# Patient Record
Sex: FEMALE | Race: WHITE | NOT HISPANIC OR LATINO | Employment: OTHER | URBAN - METROPOLITAN AREA
[De-identification: names, ages, dates, MRNs, and addresses within clinical notes are randomized per-mention and may not be internally consistent; named-entity substitution may affect disease eponyms.]

---

## 2023-04-10 PROBLEM — N39.43 POST-VOID DRIBBLING: Status: ACTIVE | Noted: 2023-04-10

## 2023-04-10 PROBLEM — E61.1 IRON DEFICIENCY: Status: ACTIVE | Noted: 2023-04-10

## 2023-04-10 PROBLEM — H61.21 EXCESSIVE CERUMEN IN RIGHT EAR CANAL: Status: ACTIVE | Noted: 2023-04-10

## 2023-04-10 PROBLEM — K21.9 ESOPHAGEAL REFLUX: Status: ACTIVE | Noted: 2023-04-10

## 2023-04-10 PROBLEM — Z96.659 HISTORY OF KNEE REPLACEMENT: Status: ACTIVE | Noted: 2023-04-10

## 2023-04-10 PROBLEM — E66.811 CLASS 1 OBESITY WITH BODY MASS INDEX (BMI) OF 31.0 TO 31.9 IN ADULT: Status: ACTIVE | Noted: 2023-04-10

## 2023-04-10 PROBLEM — I35.0 AORTIC VALVE STENOSIS, MODERATE: Status: ACTIVE | Noted: 2023-04-10

## 2023-04-10 PROBLEM — R25.2 LEG CRAMPS: Status: ACTIVE | Noted: 2023-04-10

## 2023-04-10 PROBLEM — R32 URINARY INCONTINENCE: Status: ACTIVE | Noted: 2023-04-10

## 2023-04-10 PROBLEM — L98.9 SKIN LESIONS, GENERALIZED: Status: ACTIVE | Noted: 2023-04-10

## 2023-04-10 PROBLEM — Z85.828 HISTORY OF BASAL CELL CARCINOMA (BCC): Status: ACTIVE | Noted: 2023-04-10

## 2023-04-10 PROBLEM — H02.401 PTOSIS OF RIGHT EYELID: Status: ACTIVE | Noted: 2023-04-10

## 2023-04-10 PROBLEM — E66.9 CLASS 1 OBESITY WITH BODY MASS INDEX (BMI) OF 31.0 TO 31.9 IN ADULT: Status: ACTIVE | Noted: 2023-04-10

## 2023-04-10 PROBLEM — R01.1 MURMUR: Status: ACTIVE | Noted: 2023-04-10

## 2023-04-10 PROBLEM — M25.472 ANKLE EDEMA, BILATERAL: Status: ACTIVE | Noted: 2023-04-10

## 2023-04-10 PROBLEM — I48.91 ATRIAL FIBRILLATION STATUS POST CARDIOVERSION (MULTI): Status: ACTIVE | Noted: 2023-04-10

## 2023-04-10 PROBLEM — Z86.59 HISTORY OF CLAUSTROPHOBIA: Status: ACTIVE | Noted: 2023-04-10

## 2023-04-10 PROBLEM — M54.50 LOWER BACK PAIN: Status: ACTIVE | Noted: 2023-04-10

## 2023-04-10 PROBLEM — R60.0 LOWER LEG EDEMA: Status: ACTIVE | Noted: 2023-04-10

## 2023-04-10 PROBLEM — L72.3 SEBACEOUS CYST: Status: ACTIVE | Noted: 2023-04-10

## 2023-04-10 PROBLEM — E51.9 VITAMIN B1 DEFICIENCY: Status: ACTIVE | Noted: 2023-04-10

## 2023-04-10 PROBLEM — R05.3 CHRONIC COUGH: Status: ACTIVE | Noted: 2023-04-10

## 2023-04-10 PROBLEM — D72.819 LEUKOPENIA: Status: ACTIVE | Noted: 2023-04-10

## 2023-04-10 PROBLEM — M19.90 ARTHRITIS: Status: ACTIVE | Noted: 2023-04-10

## 2023-04-10 PROBLEM — I25.3 ATRIAL SEPTAL ANEURYSM: Status: ACTIVE | Noted: 2023-04-10

## 2023-04-10 PROBLEM — E78.1 FAMILIAL HYPERTRIGLYCERIDEMIA: Status: ACTIVE | Noted: 2023-04-10

## 2023-04-10 PROBLEM — R09.89 BRUIT OF LEFT CAROTID ARTERY: Status: ACTIVE | Noted: 2023-04-10

## 2023-04-10 PROBLEM — R26.89 IMPAIRMENT OF BALANCE: Status: ACTIVE | Noted: 2023-04-10

## 2023-04-10 PROBLEM — L65.9 THINNING HAIR: Status: ACTIVE | Noted: 2023-04-10

## 2023-04-10 PROBLEM — R79.89 AZOTEMIA: Status: ACTIVE | Noted: 2023-04-10

## 2023-04-10 PROBLEM — M25.471 ANKLE EDEMA, BILATERAL: Status: ACTIVE | Noted: 2023-04-10

## 2023-04-10 PROBLEM — E55.9 VITAMIN D DEFICIENCY: Status: ACTIVE | Noted: 2023-04-10

## 2023-04-10 PROBLEM — H73.899: Status: ACTIVE | Noted: 2023-04-10

## 2023-04-10 PROBLEM — I83.90 ASYMPTOMATIC VARICOSE VEINS: Status: ACTIVE | Noted: 2023-04-10

## 2023-04-10 PROBLEM — M19.041 ARTHRITIS OF BOTH HANDS: Status: ACTIVE | Noted: 2023-04-10

## 2023-04-10 PROBLEM — G62.9 PERIPHERAL NEUROPATHY: Status: ACTIVE | Noted: 2023-04-10

## 2023-04-10 PROBLEM — M67.431 GANGLION OF RIGHT WRIST: Status: ACTIVE | Noted: 2023-04-10

## 2023-04-10 PROBLEM — M25.60 MORNING STIFFNESS OF JOINTS: Status: ACTIVE | Noted: 2023-04-10

## 2023-04-10 PROBLEM — E88.09 PROTEINS SERUM PLASMA LOW: Status: ACTIVE | Noted: 2023-04-10

## 2023-04-10 PROBLEM — E53.8 VITAMIN B12 DEFICIENCY (NON ANEMIC): Status: ACTIVE | Noted: 2023-04-10

## 2023-04-10 PROBLEM — M48.061 LUMBAR CANAL STENOSIS: Status: ACTIVE | Noted: 2023-04-10

## 2023-04-10 PROBLEM — R49.0 HOARSENESS, CHRONIC: Status: ACTIVE | Noted: 2023-04-10

## 2023-04-10 PROBLEM — H61.21 IMPACTED CERUMEN OF RIGHT EAR: Status: ACTIVE | Noted: 2023-04-10

## 2023-04-10 PROBLEM — Z04.9 CONDITION NOT FOUND: Status: ACTIVE | Noted: 2023-04-10

## 2023-04-10 PROBLEM — R09.89 RALES: Status: ACTIVE | Noted: 2023-04-10

## 2023-04-10 PROBLEM — E83.42 HYPOMAGNESEMIA: Status: ACTIVE | Noted: 2023-04-10

## 2023-04-10 PROBLEM — I10 BENIGN ESSENTIAL HYPERTENSION: Status: ACTIVE | Noted: 2023-04-10

## 2023-04-10 PROBLEM — B35.1 FUNGAL INFECTION OF NAIL: Status: ACTIVE | Noted: 2023-04-10

## 2023-04-10 PROBLEM — R09.82 POST-NASAL DRAINAGE: Status: ACTIVE | Noted: 2023-04-10

## 2023-04-10 PROBLEM — D69.6 THROMBOCYTOPENIA (CMS-HCC): Status: ACTIVE | Noted: 2023-04-10

## 2023-04-10 PROBLEM — N18.31 STAGE 3A CHRONIC KIDNEY DISEASE (MULTI): Status: ACTIVE | Noted: 2023-04-10

## 2023-04-10 PROBLEM — K57.90 DIVERTICULOSIS: Status: ACTIVE | Noted: 2023-04-10

## 2023-04-10 PROBLEM — J45.909 ASTHMA (HHS-HCC): Status: ACTIVE | Noted: 2023-04-10

## 2023-04-10 PROBLEM — F41.9 ANXIETY DISORDER: Status: ACTIVE | Noted: 2023-04-10

## 2023-04-10 PROBLEM — D49.2 NEOPLASM OF SKIN OF FACE: Status: ACTIVE | Noted: 2023-04-10

## 2023-04-10 PROBLEM — Z85.828 HISTORY OF SQUAMOUS CELL CARCINOMA OF SKIN: Status: ACTIVE | Noted: 2023-04-10

## 2023-04-10 PROBLEM — M19.042 ARTHRITIS OF BOTH HANDS: Status: ACTIVE | Noted: 2023-04-10

## 2023-04-10 PROBLEM — J30.9 ALLERGIC RHINITIS: Status: ACTIVE | Noted: 2023-04-10

## 2023-04-10 PROBLEM — J34.89 SINUS DRAINAGE: Status: ACTIVE | Noted: 2023-04-10

## 2023-04-10 PROBLEM — I72.8 SPLENIC ARTERY ANEURYSM (CMS-HCC): Status: ACTIVE | Noted: 2023-04-10

## 2023-04-10 PROBLEM — R79.89 ELEVATED SERUM CREATININE: Status: ACTIVE | Noted: 2023-04-10

## 2023-04-10 PROBLEM — R09.89 CAROTID BRUIT: Status: ACTIVE | Noted: 2023-04-10

## 2023-04-10 PROBLEM — I48.19 PERSISTENT ATRIAL FIBRILLATION (MULTI): Status: ACTIVE | Noted: 2023-04-10

## 2023-04-10 PROBLEM — R51.9 FREQUENT HEADACHES: Status: ACTIVE | Noted: 2023-04-10

## 2023-04-10 PROBLEM — R94.31 ABNORMAL EKG: Status: ACTIVE | Noted: 2023-04-10

## 2023-04-10 PROBLEM — N39.492 POSTURAL URINARY INCONTINENCE: Status: ACTIVE | Noted: 2023-04-10

## 2023-04-10 PROBLEM — E78.00 ELEVATED LDL CHOLESTEROL LEVEL: Status: ACTIVE | Noted: 2023-04-10

## 2023-04-10 PROBLEM — E87.6 HYPOKALEMIA: Status: ACTIVE | Noted: 2023-04-10

## 2023-04-10 PROBLEM — R32 BLADDER INCONTINENCE: Status: ACTIVE | Noted: 2023-04-10

## 2023-04-10 RX ORDER — ACETAMINOPHEN 500 MG
50 TABLET ORAL DAILY
COMMUNITY

## 2023-04-10 RX ORDER — PANTOPRAZOLE SODIUM 40 MG/1
40 TABLET, DELAYED RELEASE ORAL
COMMUNITY
Start: 2011-12-09 | End: 2023-08-01 | Stop reason: SDUPTHER

## 2023-04-10 RX ORDER — CHLORTHALIDONE 25 MG/1
25 TABLET ORAL DAILY
COMMUNITY
Start: 2014-01-28 | End: 2023-08-01 | Stop reason: SDUPTHER

## 2023-04-10 RX ORDER — SERTRALINE HYDROCHLORIDE 50 MG/1
1.5 TABLET, FILM COATED ORAL DAILY
COMMUNITY
End: 2023-08-01 | Stop reason: SDUPTHER

## 2023-04-10 RX ORDER — MAGNESIUM 200 MG
1 TABLET ORAL DAILY
COMMUNITY
Start: 2020-02-06

## 2023-04-10 RX ORDER — AZELASTINE 1 MG/ML
2 SPRAY, METERED NASAL 2 TIMES DAILY PRN
COMMUNITY
Start: 2021-02-23 | End: 2023-10-18 | Stop reason: SDUPTHER

## 2023-04-10 RX ORDER — IRBESARTAN 75 MG/1
1 TABLET ORAL DAILY
COMMUNITY
Start: 2019-10-22 | End: 2023-08-01 | Stop reason: SDUPTHER
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
## 2023-04-11 ENCOUNTER — OFFICE VISIT (OUTPATIENT)
Dept: PRIMARY CARE | Facility: CLINIC | Age: 88
End: 2023-04-11
Payer: MEDICARE

## 2023-04-11 ENCOUNTER — LAB (OUTPATIENT)
Dept: LAB | Facility: LAB | Age: 88
End: 2023-04-11
Payer: MEDICARE

## 2023-04-11 VITALS
WEIGHT: 155 LBS | HEIGHT: 61 IN | HEART RATE: 64 BPM | BODY MASS INDEX: 29.27 KG/M2 | DIASTOLIC BLOOD PRESSURE: 68 MMHG | SYSTOLIC BLOOD PRESSURE: 116 MMHG | RESPIRATION RATE: 18 BRPM

## 2023-04-11 DIAGNOSIS — J45.20 MILD INTERMITTENT ASTHMA WITHOUT COMPLICATION (HHS-HCC): ICD-10-CM

## 2023-04-11 DIAGNOSIS — I72.8 SPLENIC ARTERY ANEURYSM (CMS-HCC): ICD-10-CM

## 2023-04-11 DIAGNOSIS — T23.029A BURN INJURY OF SKIN OF FINGER: ICD-10-CM

## 2023-04-11 DIAGNOSIS — D69.6 THROMBOCYTOPENIA (CMS-HCC): ICD-10-CM

## 2023-04-11 DIAGNOSIS — R79.9 ABNORMAL FINDING OF BLOOD CHEMISTRY, UNSPECIFIED: ICD-10-CM

## 2023-04-11 DIAGNOSIS — Z79.01 ANTICOAGULANT LONG-TERM USE: ICD-10-CM

## 2023-04-11 DIAGNOSIS — I10 BENIGN ESSENTIAL HYPERTENSION: Primary | ICD-10-CM

## 2023-04-11 DIAGNOSIS — N18.31 STAGE 3A CHRONIC KIDNEY DISEASE (MULTI): ICD-10-CM

## 2023-04-11 DIAGNOSIS — R60.0 LOWER LEG EDEMA: ICD-10-CM

## 2023-04-11 DIAGNOSIS — R05.3 CHRONIC COUGH: ICD-10-CM

## 2023-04-11 DIAGNOSIS — I35.0 AORTIC VALVE STENOSIS, MODERATE: ICD-10-CM

## 2023-04-11 DIAGNOSIS — I48.91 ATRIAL FIBRILLATION STATUS POST CARDIOVERSION (MULTI): ICD-10-CM

## 2023-04-11 DIAGNOSIS — G60.9 IDIOPATHIC PERIPHERAL NEUROPATHY: ICD-10-CM

## 2023-04-11 LAB
ANION GAP IN SER/PLAS: 13 MMOL/L (ref 10–20)
BASOPHILS (10*3/UL) IN BLOOD BY AUTOMATED COUNT: 0.02 X10E9/L (ref 0–0.1)
BASOPHILS/100 LEUKOCYTES IN BLOOD BY AUTOMATED COUNT: 0.5 % (ref 0–2)
CALCIUM (MG/DL) IN SER/PLAS: 9.3 MG/DL (ref 8.6–10.6)
CARBON DIOXIDE, TOTAL (MMOL/L) IN SER/PLAS: 29 MMOL/L (ref 21–32)
CHLORIDE (MMOL/L) IN SER/PLAS: 105 MMOL/L (ref 98–107)
CREATININE (MG/DL) IN SER/PLAS: 0.99 MG/DL (ref 0.5–1.05)
EOSINOPHILS (10*3/UL) IN BLOOD BY AUTOMATED COUNT: 0.07 X10E9/L (ref 0–0.4)
EOSINOPHILS/100 LEUKOCYTES IN BLOOD BY AUTOMATED COUNT: 1.7 % (ref 0–6)
ERYTHROCYTE DISTRIBUTION WIDTH (RATIO) BY AUTOMATED COUNT: 14.4 % (ref 11.5–14.5)
ERYTHROCYTE MEAN CORPUSCULAR HEMOGLOBIN CONCENTRATION (G/DL) BY AUTOMATED: 30.7 G/DL (ref 32–36)
ERYTHROCYTE MEAN CORPUSCULAR VOLUME (FL) BY AUTOMATED COUNT: 87 FL (ref 80–100)
ERYTHROCYTES (10*6/UL) IN BLOOD BY AUTOMATED COUNT: 4.15 X10E12/L (ref 4–5.2)
ESTIMATED AVERAGE GLUCOSE FOR HBA1C: 120 MG/DL
GFR FEMALE: 55 ML/MIN/1.73M2
GLUCOSE (MG/DL) IN SER/PLAS: 95 MG/DL (ref 74–99)
HEMATOCRIT (%) IN BLOOD BY AUTOMATED COUNT: 36.1 % (ref 36–46)
HEMOGLOBIN (G/DL) IN BLOOD: 11.1 G/DL (ref 12–16)
HEMOGLOBIN A1C/HEMOGLOBIN TOTAL IN BLOOD: 5.8 %
IMMATURE GRANULOCYTES/100 LEUKOCYTES IN BLOOD BY AUTOMATED COUNT: 0.2 % (ref 0–0.9)
LEUKOCYTES (10*3/UL) IN BLOOD BY AUTOMATED COUNT: 4.2 X10E9/L (ref 4.4–11.3)
LYMPHOCYTES (10*3/UL) IN BLOOD BY AUTOMATED COUNT: 1.32 X10E9/L (ref 0.8–3)
LYMPHOCYTES/100 LEUKOCYTES IN BLOOD BY AUTOMATED COUNT: 31.2 % (ref 13–44)
MONOCYTES (10*3/UL) IN BLOOD BY AUTOMATED COUNT: 0.52 X10E9/L (ref 0.05–0.8)
MONOCYTES/100 LEUKOCYTES IN BLOOD BY AUTOMATED COUNT: 12.3 % (ref 2–10)
NEUTROPHILS (10*3/UL) IN BLOOD BY AUTOMATED COUNT: 2.29 X10E9/L (ref 1.6–5.5)
NEUTROPHILS/100 LEUKOCYTES IN BLOOD BY AUTOMATED COUNT: 54.1 % (ref 40–80)
NRBC (PER 100 WBCS) BY AUTOMATED COUNT: 0 /100 WBC (ref 0–0)
PLATELETS (10*3/UL) IN BLOOD AUTOMATED COUNT: 153 X10E9/L (ref 150–450)
POTASSIUM (MMOL/L) IN SER/PLAS: 4 MMOL/L (ref 3.5–5.3)
SODIUM (MMOL/L) IN SER/PLAS: 143 MMOL/L (ref 136–145)
UREA NITROGEN (MG/DL) IN SER/PLAS: 28 MG/DL (ref 6–23)

## 2023-04-11 PROCEDURE — 80048 BASIC METABOLIC PNL TOTAL CA: CPT

## 2023-04-11 PROCEDURE — 3074F SYST BP LT 130 MM HG: CPT | Performed by: INTERNAL MEDICINE

## 2023-04-11 PROCEDURE — 1036F TOBACCO NON-USER: CPT | Performed by: INTERNAL MEDICINE

## 2023-04-11 PROCEDURE — 1159F MED LIST DOCD IN RCRD: CPT | Performed by: INTERNAL MEDICINE

## 2023-04-11 PROCEDURE — 85025 COMPLETE CBC W/AUTO DIFF WBC: CPT

## 2023-04-11 PROCEDURE — 83036 HEMOGLOBIN GLYCOSYLATED A1C: CPT

## 2023-04-11 PROCEDURE — 1160F RVW MEDS BY RX/DR IN RCRD: CPT | Performed by: INTERNAL MEDICINE

## 2023-04-11 PROCEDURE — 99214 OFFICE O/P EST MOD 30 MIN: CPT | Performed by: INTERNAL MEDICINE

## 2023-04-11 PROCEDURE — 36415 COLL VENOUS BLD VENIPUNCTURE: CPT

## 2023-04-11 PROCEDURE — 3078F DIAST BP <80 MM HG: CPT | Performed by: INTERNAL MEDICINE

## 2023-04-11 ASSESSMENT — PAIN SCALES - GENERAL: PAINLEVEL: 0-NO PAIN

## 2023-04-11 NOTE — PATIENT INSTRUCTIONS
It was nice to see you today.  Please get a blood test not fasting , today or soon to check on eliquis and kidney blood work.    We talked about the peripheral neuropathy you have and it affects your ability to tell if you burn yourself, and to open jars. It affects your feet and legs as well and the cane is a good idea.  You mentioned you did not want more testing on this and that is ok.    Blood pressure is good, heart rhythm good today and murmur is the same/stable.    Follow up 4 months, keep august appt and then wellness in October.    Exercise daily, keep moving.   Call if you want to do any physical therapy for balance.  Be careful to watch where hands are when cooking as you are not going to be able to tell if something is too hot.

## 2023-04-19 ENCOUNTER — TELEPHONE (OUTPATIENT)
Dept: PRIMARY CARE | Facility: CLINIC | Age: 88
End: 2023-04-19
Payer: MEDICARE

## 2023-04-19 DIAGNOSIS — I48.91 ATRIAL FIBRILLATION STATUS POST CARDIOVERSION (MULTI): Primary | ICD-10-CM

## 2023-04-20 DIAGNOSIS — D64.9 ANEMIA, UNSPECIFIED TYPE: Primary | ICD-10-CM

## 2023-04-20 NOTE — RESULT ENCOUNTER NOTE
Notify patient kidney tests stable and better. No diabetes found. Anemia is back and blood count is a little lower than it has been. She said she had not seen any bleeding. (Is on eliquis). PLAN:  Repeat cbc in one month- see orders. If worse will need further eval. I also ordered stool FIT test-can be picked up then, and if blood count is back to normal, she will not need to do it, but if not improved, she would need to do this tet.

## 2023-05-03 ENCOUNTER — LAB (OUTPATIENT)
Dept: LAB | Facility: LAB | Age: 88
End: 2023-05-03
Payer: MEDICARE

## 2023-05-03 DIAGNOSIS — D64.9 ANEMIA, UNSPECIFIED TYPE: ICD-10-CM

## 2023-05-03 LAB
BASOPHILS (10*3/UL) IN BLOOD BY AUTOMATED COUNT: 0.01 X10E9/L (ref 0–0.1)
BASOPHILS/100 LEUKOCYTES IN BLOOD BY AUTOMATED COUNT: 0.2 % (ref 0–2)
COBALAMIN (VITAMIN B12) (PG/ML) IN SER/PLAS: 632 PG/ML (ref 211–911)
EOSINOPHILS (10*3/UL) IN BLOOD BY AUTOMATED COUNT: 0.09 X10E9/L (ref 0–0.4)
EOSINOPHILS/100 LEUKOCYTES IN BLOOD BY AUTOMATED COUNT: 2.1 % (ref 0–6)
ERYTHROCYTE DISTRIBUTION WIDTH (RATIO) BY AUTOMATED COUNT: 14.5 % (ref 11.5–14.5)
ERYTHROCYTE MEAN CORPUSCULAR HEMOGLOBIN CONCENTRATION (G/DL) BY AUTOMATED: 30.8 G/DL (ref 32–36)
ERYTHROCYTE MEAN CORPUSCULAR VOLUME (FL) BY AUTOMATED COUNT: 86 FL (ref 80–100)
ERYTHROCYTES (10*6/UL) IN BLOOD BY AUTOMATED COUNT: 4.13 X10E12/L (ref 4–5.2)
FERRITIN (UG/LL) IN SER/PLAS: 106 UG/L (ref 8–150)
FOLATE (NG/ML) IN SER/PLAS: 15 NG/ML
HEMATOCRIT (%) IN BLOOD BY AUTOMATED COUNT: 35.4 % (ref 36–46)
HEMOGLOBIN (G/DL) IN BLOOD: 10.9 G/DL (ref 12–16)
IMMATURE GRANULOCYTES/100 LEUKOCYTES IN BLOOD BY AUTOMATED COUNT: 0.2 % (ref 0–0.9)
IRON (UG/DL) IN SER/PLAS: 30 UG/DL (ref 35–150)
IRON BINDING CAPACITY (UG/DL) IN SER/PLAS: 345 UG/DL (ref 240–445)
IRON SATURATION (%) IN SER/PLAS: 9 % (ref 25–45)
LEUKOCYTES (10*3/UL) IN BLOOD BY AUTOMATED COUNT: 4.4 X10E9/L (ref 4.4–11.3)
LYMPHOCYTES (10*3/UL) IN BLOOD BY AUTOMATED COUNT: 1.44 X10E9/L (ref 0.8–3)
LYMPHOCYTES/100 LEUKOCYTES IN BLOOD BY AUTOMATED COUNT: 32.9 % (ref 13–44)
MONOCYTES (10*3/UL) IN BLOOD BY AUTOMATED COUNT: 0.57 X10E9/L (ref 0.05–0.8)
MONOCYTES/100 LEUKOCYTES IN BLOOD BY AUTOMATED COUNT: 13 % (ref 2–10)
NEUTROPHILS (10*3/UL) IN BLOOD BY AUTOMATED COUNT: 2.26 X10E9/L (ref 1.6–5.5)
NEUTROPHILS/100 LEUKOCYTES IN BLOOD BY AUTOMATED COUNT: 51.6 % (ref 40–80)
NRBC (PER 100 WBCS) BY AUTOMATED COUNT: 0 /100 WBC (ref 0–0)
PLATELETS (10*3/UL) IN BLOOD AUTOMATED COUNT: 168 X10E9/L (ref 150–450)

## 2023-05-03 PROCEDURE — 85025 COMPLETE CBC W/AUTO DIFF WBC: CPT

## 2023-05-03 PROCEDURE — 82746 ASSAY OF FOLIC ACID SERUM: CPT

## 2023-05-03 PROCEDURE — 83540 ASSAY OF IRON: CPT

## 2023-05-03 PROCEDURE — 83550 IRON BINDING TEST: CPT

## 2023-05-03 PROCEDURE — 36415 COLL VENOUS BLD VENIPUNCTURE: CPT

## 2023-05-03 PROCEDURE — 82728 ASSAY OF FERRITIN: CPT

## 2023-05-03 PROCEDURE — 82607 VITAMIN B-12: CPT

## 2023-05-04 DIAGNOSIS — R60.0 LOCALIZED EDEMA: ICD-10-CM

## 2023-05-04 DIAGNOSIS — Z87.19 PERSONAL HISTORY OF OTHER DISEASES OF THE DIGESTIVE SYSTEM: ICD-10-CM

## 2023-05-17 NOTE — TELEPHONE ENCOUNTER
Hydrochlorothiazide, 25 mg, 1/day and not Sunday     Sertraline, 1 1 /2 tablet a day     [facility].

## 2023-05-24 RX ORDER — IRBESARTAN 75 MG/1
TABLET ORAL
Qty: 90 TABLET | Refills: 3 | OUTPATIENT
Start: 2023-05-24

## 2023-05-24 RX ORDER — PANTOPRAZOLE SODIUM 40 MG/1
TABLET, DELAYED RELEASE ORAL
Qty: 90 TABLET | Refills: 3 | OUTPATIENT
Start: 2023-05-24

## 2023-07-05 ENCOUNTER — TELEPHONE (OUTPATIENT)
Dept: PRIMARY CARE | Facility: CLINIC | Age: 88
End: 2023-07-05

## 2023-07-05 ENCOUNTER — OFFICE VISIT (OUTPATIENT)
Dept: PRIMARY CARE | Facility: CLINIC | Age: 88
End: 2023-07-05
Payer: MEDICARE

## 2023-07-05 VITALS
HEIGHT: 58 IN | SYSTOLIC BLOOD PRESSURE: 116 MMHG | DIASTOLIC BLOOD PRESSURE: 54 MMHG | RESPIRATION RATE: 18 BRPM | HEART RATE: 64 BPM | WEIGHT: 158 LBS | BODY MASS INDEX: 33.17 KG/M2

## 2023-07-05 DIAGNOSIS — N18.31 STAGE 3A CHRONIC KIDNEY DISEASE (MULTI): ICD-10-CM

## 2023-07-05 DIAGNOSIS — L98.9 SKIN LESION OF RIGHT LOWER LIMB: ICD-10-CM

## 2023-07-05 DIAGNOSIS — L03.116 CELLULITIS OF LEFT LOWER LEG: ICD-10-CM

## 2023-07-05 DIAGNOSIS — I48.19 PERSISTENT ATRIAL FIBRILLATION (MULTI): ICD-10-CM

## 2023-07-05 DIAGNOSIS — L03.115 CELLULITIS OF RIGHT LOWER EXTREMITY: ICD-10-CM

## 2023-07-05 DIAGNOSIS — D64.9 ANEMIA, UNSPECIFIED TYPE: ICD-10-CM

## 2023-07-05 DIAGNOSIS — R60.0 EDEMA OF BOTH LOWER LEGS: Primary | ICD-10-CM

## 2023-07-05 DIAGNOSIS — L98.9 SKIN LESION OF LEFT LEG: ICD-10-CM

## 2023-07-05 PROCEDURE — 1126F AMNT PAIN NOTED NONE PRSNT: CPT | Performed by: INTERNAL MEDICINE

## 2023-07-05 PROCEDURE — 99215 OFFICE O/P EST HI 40 MIN: CPT | Performed by: INTERNAL MEDICINE

## 2023-07-05 PROCEDURE — 1036F TOBACCO NON-USER: CPT | Performed by: INTERNAL MEDICINE

## 2023-07-05 PROCEDURE — 1160F RVW MEDS BY RX/DR IN RCRD: CPT | Performed by: INTERNAL MEDICINE

## 2023-07-05 PROCEDURE — 1159F MED LIST DOCD IN RCRD: CPT | Performed by: INTERNAL MEDICINE

## 2023-07-05 PROCEDURE — 3078F DIAST BP <80 MM HG: CPT | Performed by: INTERNAL MEDICINE

## 2023-07-05 PROCEDURE — 3074F SYST BP LT 130 MM HG: CPT | Performed by: INTERNAL MEDICINE

## 2023-07-05 RX ORDER — DOXYCYCLINE 100 MG/1
100 CAPSULE ORAL 2 TIMES DAILY
Qty: 20 CAPSULE | Refills: 0 | COMMUNITY
Start: 2023-07-03 | End: 2023-07-13

## 2023-07-05 RX ORDER — FUROSEMIDE 40 MG/1
TABLET ORAL
Qty: 15 TABLET | Refills: 0 | Status: SHIPPED | OUTPATIENT
Start: 2023-07-05 | End: 2023-10-18 | Stop reason: SDUPTHER

## 2023-07-05 ASSESSMENT — PAIN SCALES - GENERAL: PAINLEVEL: 2
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
## 2023-07-05 NOTE — TELEPHONE ENCOUNTER
Pt was in urgent care for sores on her legs on Monday. Pt was given an antibiotic (doxycycline BID x 10 days)  and was told to FU with us. Pt denies any fever. No drainage. She was asked to come in today at 5 per VO Dr Perez.

## 2023-07-05 NOTE — PATIENT INSTRUCTIONS
You: Call wound clinic to make an appointment:  [facility] wound care  [address]   [address]  [place]  [address]  Phone [phone].  I placed the referral to [facility] wound clinic :if you cannot get an appointment with [facility], then  [place] is our back up.    Blood test tomorrow -not fasting. Best to do in the am so I can get the results back promptly.  We will schedule doppler ultrasound to check for blood clots in your legs.    Finish doxycycline, take for 10 days.  Add furosemide/lasix 40mg: one tab once per day in the am for 3 days.  Elevate legs often throughout the day: Toes above level of heart: to shoulder level.Do this for 30 minutes at a time several times throughout the day.  Keep legs wrapped.    Keep 8-1 appt.   But also, add next week to check legs.

## 2023-07-06 ENCOUNTER — LAB (OUTPATIENT)
Dept: LAB | Facility: LAB | Age: 88
End: 2023-07-06
Payer: MEDICARE

## 2023-07-06 DIAGNOSIS — L03.115 CELLULITIS OF RIGHT LOWER EXTREMITY: ICD-10-CM

## 2023-07-06 DIAGNOSIS — D64.9 ANEMIA, UNSPECIFIED TYPE: ICD-10-CM

## 2023-07-06 DIAGNOSIS — N18.31 STAGE 3A CHRONIC KIDNEY DISEASE (MULTI): ICD-10-CM

## 2023-07-06 DIAGNOSIS — R60.0 EDEMA OF BOTH LOWER LEGS: ICD-10-CM

## 2023-07-06 LAB
ALANINE AMINOTRANSFERASE (SGPT) (U/L) IN SER/PLAS: 7 U/L (ref 7–45)
ALBUMIN (G/DL) IN SER/PLAS: 3.8 G/DL (ref 3.4–5)
ALKALINE PHOSPHATASE (U/L) IN SER/PLAS: 80 U/L (ref 33–136)
ANION GAP IN SER/PLAS: 13 MMOL/L (ref 10–20)
ASPARTATE AMINOTRANSFERASE (SGOT) (U/L) IN SER/PLAS: 12 U/L (ref 9–39)
BASOPHILS (10*3/UL) IN BLOOD BY AUTOMATED COUNT: 0.01 X10E9/L (ref 0–0.1)
BASOPHILS/100 LEUKOCYTES IN BLOOD BY AUTOMATED COUNT: 0.2 % (ref 0–2)
BILIRUBIN TOTAL (MG/DL) IN SER/PLAS: 0.7 MG/DL (ref 0–1.2)
CALCIUM (MG/DL) IN SER/PLAS: 9.1 MG/DL (ref 8.6–10.6)
CARBON DIOXIDE, TOTAL (MMOL/L) IN SER/PLAS: 29 MMOL/L (ref 21–32)
CHLORIDE (MMOL/L) IN SER/PLAS: 104 MMOL/L (ref 98–107)
CREATININE (MG/DL) IN SER/PLAS: 1.33 MG/DL (ref 0.5–1.05)
EOSINOPHILS (10*3/UL) IN BLOOD BY AUTOMATED COUNT: 0.06 X10E9/L (ref 0–0.4)
EOSINOPHILS/100 LEUKOCYTES IN BLOOD BY AUTOMATED COUNT: 1.4 % (ref 0–6)
ERYTHROCYTE DISTRIBUTION WIDTH (RATIO) BY AUTOMATED COUNT: 13.7 % (ref 11.5–14.5)
ERYTHROCYTE MEAN CORPUSCULAR HEMOGLOBIN CONCENTRATION (G/DL) BY AUTOMATED: 32 G/DL (ref 32–36)
ERYTHROCYTE MEAN CORPUSCULAR VOLUME (FL) BY AUTOMATED COUNT: 85 FL (ref 80–100)
ERYTHROCYTES (10*6/UL) IN BLOOD BY AUTOMATED COUNT: 3.71 X10E12/L (ref 4–5.2)
GFR FEMALE: 38 ML/MIN/1.73M2
GLUCOSE (MG/DL) IN SER/PLAS: 111 MG/DL (ref 74–99)
HEMATOCRIT (%) IN BLOOD BY AUTOMATED COUNT: 31.6 % (ref 36–46)
HEMOGLOBIN (G/DL) IN BLOOD: 10.1 G/DL (ref 12–16)
IMMATURE GRANULOCYTES/100 LEUKOCYTES IN BLOOD BY AUTOMATED COUNT: 0.2 % (ref 0–0.9)
LEUKOCYTES (10*3/UL) IN BLOOD BY AUTOMATED COUNT: 4.3 X10E9/L (ref 4.4–11.3)
LYMPHOCYTES (10*3/UL) IN BLOOD BY AUTOMATED COUNT: 1.24 X10E9/L (ref 0.8–3)
LYMPHOCYTES/100 LEUKOCYTES IN BLOOD BY AUTOMATED COUNT: 29.1 % (ref 13–44)
MONOCYTES (10*3/UL) IN BLOOD BY AUTOMATED COUNT: 0.53 X10E9/L (ref 0.05–0.8)
MONOCYTES/100 LEUKOCYTES IN BLOOD BY AUTOMATED COUNT: 12.4 % (ref 2–10)
NEUTROPHILS (10*3/UL) IN BLOOD BY AUTOMATED COUNT: 2.41 X10E9/L (ref 1.6–5.5)
NEUTROPHILS/100 LEUKOCYTES IN BLOOD BY AUTOMATED COUNT: 56.7 % (ref 40–80)
NRBC (PER 100 WBCS) BY AUTOMATED COUNT: 0 /100 WBC (ref 0–0)
PLATELETS (10*3/UL) IN BLOOD AUTOMATED COUNT: 159 X10E9/L (ref 150–450)
POTASSIUM (MMOL/L) IN SER/PLAS: 4.1 MMOL/L (ref 3.5–5.3)
PROTEIN TOTAL: 5.8 G/DL (ref 6.4–8.2)
SODIUM (MMOL/L) IN SER/PLAS: 142 MMOL/L (ref 136–145)
UREA NITROGEN (MG/DL) IN SER/PLAS: 34 MG/DL (ref 6–23)

## 2023-07-06 PROCEDURE — 85025 COMPLETE CBC W/AUTO DIFF WBC: CPT

## 2023-07-06 PROCEDURE — 80053 COMPREHEN METABOLIC PANEL: CPT

## 2023-07-06 PROCEDURE — 36415 COLL VENOUS BLD VENIPUNCTURE: CPT

## 2023-07-12 ENCOUNTER — OFFICE VISIT (OUTPATIENT)
Dept: PRIMARY CARE | Facility: CLINIC | Age: 88
End: 2023-07-12
Payer: MEDICARE

## 2023-07-12 ENCOUNTER — LAB (OUTPATIENT)
Dept: LAB | Facility: LAB | Age: 88
End: 2023-07-12
Payer: MEDICARE

## 2023-07-12 VITALS
RESPIRATION RATE: 16 BRPM | WEIGHT: 153 LBS | BODY MASS INDEX: 32.12 KG/M2 | SYSTOLIC BLOOD PRESSURE: 124 MMHG | HEART RATE: 60 BPM | HEIGHT: 58 IN | DIASTOLIC BLOOD PRESSURE: 62 MMHG

## 2023-07-12 DIAGNOSIS — D64.9 ANEMIA, UNSPECIFIED TYPE: Primary | ICD-10-CM

## 2023-07-12 DIAGNOSIS — R60.0 EDEMA OF BOTH LOWER LEGS: ICD-10-CM

## 2023-07-12 DIAGNOSIS — Z79.01 ANTICOAGULANT LONG-TERM USE: ICD-10-CM

## 2023-07-12 DIAGNOSIS — N18.31 STAGE 3A CHRONIC KIDNEY DISEASE (MULTI): ICD-10-CM

## 2023-07-12 DIAGNOSIS — L03.115 CELLULITIS OF RIGHT LOWER EXTREMITY: ICD-10-CM

## 2023-07-12 DIAGNOSIS — L03.116 CELLULITIS OF LEFT LOWER LEG: ICD-10-CM

## 2023-07-12 DIAGNOSIS — L98.9 SKIN LESION OF RIGHT LOWER LIMB: ICD-10-CM

## 2023-07-12 DIAGNOSIS — I48.91 ATRIAL FIBRILLATION STATUS POST CARDIOVERSION (MULTI): ICD-10-CM

## 2023-07-12 DIAGNOSIS — L98.9 SKIN LESION OF LEFT LEG: ICD-10-CM

## 2023-07-12 PROCEDURE — 3074F SYST BP LT 130 MM HG: CPT | Performed by: INTERNAL MEDICINE

## 2023-07-12 PROCEDURE — 1159F MED LIST DOCD IN RCRD: CPT | Performed by: INTERNAL MEDICINE

## 2023-07-12 PROCEDURE — 3078F DIAST BP <80 MM HG: CPT | Performed by: INTERNAL MEDICINE

## 2023-07-12 PROCEDURE — 1160F RVW MEDS BY RX/DR IN RCRD: CPT | Performed by: INTERNAL MEDICINE

## 2023-07-12 PROCEDURE — 99214 OFFICE O/P EST MOD 30 MIN: CPT | Performed by: INTERNAL MEDICINE

## 2023-07-12 PROCEDURE — 1036F TOBACCO NON-USER: CPT | Performed by: INTERNAL MEDICINE

## 2023-07-12 PROCEDURE — 80048 BASIC METABOLIC PNL TOTAL CA: CPT

## 2023-07-12 PROCEDURE — 36415 COLL VENOUS BLD VENIPUNCTURE: CPT

## 2023-07-12 PROCEDURE — 1126F AMNT PAIN NOTED NONE PRSNT: CPT | Performed by: INTERNAL MEDICINE

## 2023-07-12 ASSESSMENT — PAIN SCALES - GENERAL: PAINLEVEL: 0-NO PAIN

## 2023-07-12 NOTE — PATIENT INSTRUCTIONS
Finish the antibiotic, doxycycline.  Stop the laisx/fuorsemide for now: we will see what your blood test shows and let you know if you can finish the pills you have.    Stool tests to check for hidden blood due to anemia: stool for occult blood-3 tests on 3 separate bowel movements and one stool FIT test-all samples go back to the lab and make sure your name and ate of birth and date of test are on each sample.   the equipment to obtain the sample in the lab today.    Blood test today to check kidney function after taking the lasix.

## 2023-07-12 NOTE — PROGRESS NOTES
Subjective   Patient ID: Teresita Molina is a 88 y.o. female who presents for Follow-up (Pt was here last week because of sores on her legs. She is here today to recheck them. Pt legs are wrapped by wound care and they told her that they look really good. They change the dressings every other day and she goes there again one week later. She is due to change them tomorrow, so I did not remove the wound care dressings.).  LAST VISIT PLAN 7/05/2023  PATIENT'S DISCUSSION/SUMMARY:  You: Call wound clinic to make an appointment:  [facility] wound care  [address] [place] [address]  Phone [phone].  I placed the referral to [facility] wound clinic :if you cannot get an appointment with [facility], then  [place] is our back up.  Blood test tomorrow -not fasting. Best to do in the am so I can get the results back promptly.  We will schedule doppler ultrasound to check for blood clots in your legs.  Finish doxycycline, take for 10 days.  Add furosemide/lasix 40mg: one tab once per day in the am for 3 days.  Elevate legs often throughout the day: Toes above level of heart: to shoulder level.Do this for 30 minutes at a time several times throughout the day.  Keep legs wrapped.  Keep 8-1 appt.   But also, add next week to check legs.   PROVIDER'S IMPRESSIONS:  Edema of both lower legs  Cellulitis of right lower extremity  Skin lesion of right lower limb  Skin lesion of left leg  Cellulitis of left lower leg  Anemia, unspecified type  Stage 3a chronic kidney disease  Orders Placed  CBC and Auto Differential  Comprehensive metabolic panel  Vascular US lower extremity venous duplex bilateral  Follow Up In [facility] - PCP  Referral to Wound Clinic Authorized  Medication Changes  furosemide 40 mg Take one tab every am For 3 days and then as directed.  Encounters with Related Results  Lab (7/6/2023)  END INFO FROM PRIOR VISIT    HPI  Here to follow up after lasix x 3  days for leg swelling and kirsten on leg ulcers and superficial skin infeciton.  She is still on the antibiotics.  She took lasix once per day for an unclear number of days. She still has some left, she took them at least 3 days, she cannot tell me how many days. Her days and the numbers do not match.  Her legs are still swollen but a little better  No sob cp palpitations  She has known afib rate controlled and on anticoag.  No bleeding  No fever chills  Went to wound center yesterday, first appt.  Legs are wrapped.  She notes they said they could not see any of our records. (They can on community record but may not know how to look).  [facility] has its own independent ehr.  ul/s bilaeral legs no dvt.  Re anemia, needs stool tests to look for ob  Ckd showed some exacerbation on last weeks labs.  She denies abd pain melena hematuria hematochezia, headaches.  No diarrhea with the atb no heartburn    Soboe is same at top of steps  Had echo October 2022, has chronic leg edema just worse recently.  Last chest imaging 2021 but has no chest complaints.    Scribe Transcription:  She thought she did stool testing but I looked in the old system and there was nothing there related to that.     She states she went to wound care and is supposed to change her dressings every day. She is finishing her antibiotic. She took Lasix yesterday and she said she’s only taken three pills. She has no new Sob, CP, and her legs are not worse for swelling. She had a negative US for DVT.  She denies melena/hematochezia and she reports having a bowel movement daily. She has recently lost 5 lbs.    Scribe Attestation  By signing my name below, I, Jw Kirby   attest that this documentation has been prepared under the direction and in the presence of Maya Perez MD.   Review of Systems    Objective   Lab Results   Component Value Date    WBC 4.3 (L) 07/06/2023    HGB 10.1 (L) 07/06/2023    HCT 31.6 (L) 07/06/2023     07/06/2023     "CHOL 152 08/26/2022    TRIG 114 08/26/2022    HDL 30.1 (A) 08/26/2022    ALT 7 07/06/2023    AST 12 07/06/2023     07/12/2023    K 3.5 07/12/2023     07/12/2023    CREATININE 1.12 (H) 07/12/2023    BUN 37 (H) 07/12/2023    CO2 32 07/12/2023    TSH 2.18 08/26/2022    HGBA1C 5.8 (A) 04/11/2023     Physical ExamBP 124/62 (BP Location: Left arm, Patient Position: Sitting, BP Cuff Size: Adult)   Pulse 60   Resp 16   Ht 1.473 m (4' 10\")   Wt 69.4 kg (153 lb)   BMI 31.98 kg/m²       5/31/2022     8:53 AM 12/1/2022     1:33 PM 12/5/2022     8:46 AM 1/17/2023     9:13 AM 4/11/2023     8:35 AM 7/5/2023     4:57 PM 7/12/2023    11:14 AM   Vitals   Systolic 125  132 154 116 116 124   Diastolic 61  75 69 68 54 62   Heart Rate 76 71 72 76 64 64 60   Temp  36.8 °C (98.2 °F)        Resp  16 18  18 18 16   Height (in) 1.499 m (4' 11\") 1.499 m (4' 11\")  1.499 m (4' 11\") 1.549 m (5' 1\") 1.473 m (4' 10\") 1.473 m (4' 10\")   Weight (lb) 157.4 153.25 155 156 155 158 153   BMI 31.79 kg/m2 30.95 kg/m2 31.31 kg/m2 31.51 kg/m2 29.29 kg/m2 33.02 kg/m2 31.98 kg/m2   BSA (m2) 1.72 m2 1.7 m2 1.71 m2 1.72 m2 1.74 m2 1.71 m2 1.69 m2   Visit Report     Report Report Report       Patient  is in no obvious distress. Normal rep rate, not sob.  HEENT:   Normocephalic, no facial asymmetry  Eyes: Sclera and conjunctiva are clear.  Neck: No adenopathy cervical (Ant/post/lat), no Supraclavicular nodes.   Thyroid normal.  Carotid pulses normal, no carotid bruits.  Lungs : RR normal. Clear to auscultation anterior, posterior and lateral. No rales, wheezes rhonchi or rubs. Good air exchange.  Heart: afib tho fairly regular. Aortic stenosis systolic Murmur heard, unchanged.No, gallop, click or rub.  Abd: see last weeks exam.Extremities: no upper extremity edema. Legs are both wrapped to knees. I peeked under right wrap and can tell the pink/redness is less than when here last week. Also legs still swollen but less so and she notes they ar shira " longer seeping.  Musculoskeletal: no synovitis of joints seen. No new deformity.  Neuro: CN 2-12 intact. Alert, appropriate.  Ambulates independently.with cane. Has neuropathy.  No gross motor deficit.   No tremors.  Psych: normal mood and affect. Not happy at wait in office.   Skin: No rash, bruising petechiae or jaundice. In visible skin. Legs as above.    Assessment/Plan   Problem List Items Addressed This Visit       Atrial fibrillation status post cardioversion (CMS/[facility])    Stage 3a chronic kidney disease    Relevant Orders    Basic metabolic panel (Completed)     Other Visit Diagnoses       Anemia, unspecified type    -  Primary    Relevant Orders    Fecal Occult Blood Immunoassy    Occult Blood X 1, Stool    Occult blood x 1, stool    Occult Blood X 1, Stool    Cellulitis of right lower extremity        Edema of both lower legs        Cellulitis of left lower leg        Skin lesion of right lower limb        Skin lesion of left leg        Anticoagulant long-term use             Her anemia is mild, but she does need a GI work-up at least to assess for hidden bleeding.  Stool testing ordered, she is on an anticoagulant so that will complicate things but will be helpful if they are negative.  She had an acute process of CKD 3 AA, we need to recheck those labs now that she has been on Lasix to be sure not worse.  Her leg edema is improving slowly, if her renal function is stable to improved then we will add more days of furosemide.  She needs to finish the antibiotic for the cellulitis but I can tell is already improving and apparently the wound center thought that it looked good.  This would not of been a description for of her legs from last week.  Chronic A-fib unchanged rate controlled  Aortic stenosis, chronic heart failure, no respiratory symptoms.  Close follow-up.

## 2023-07-13 LAB
ANION GAP IN SER/PLAS: 16 MMOL/L (ref 10–20)
CALCIUM (MG/DL) IN SER/PLAS: 8.9 MG/DL (ref 8.6–10.6)
CARBON DIOXIDE, TOTAL (MMOL/L) IN SER/PLAS: 32 MMOL/L (ref 21–32)
CHLORIDE (MMOL/L) IN SER/PLAS: 101 MMOL/L (ref 98–107)
CREATININE (MG/DL) IN SER/PLAS: 1.12 MG/DL (ref 0.5–1.05)
GFR FEMALE: 47 ML/MIN/1.73M2
GLUCOSE (MG/DL) IN SER/PLAS: 85 MG/DL (ref 74–99)
POTASSIUM (MMOL/L) IN SER/PLAS: 3.5 MMOL/L (ref 3.5–5.3)
SODIUM (MMOL/L) IN SER/PLAS: 145 MMOL/L (ref 136–145)
UREA NITROGEN (MG/DL) IN SER/PLAS: 37 MG/DL (ref 6–23)

## 2023-07-14 ENCOUNTER — TELEPHONE (OUTPATIENT)
Dept: PRIMARY CARE | Facility: CLINIC | Age: 88
End: 2023-07-14
Payer: MEDICARE

## 2023-07-14 NOTE — TELEPHONE ENCOUNTER
----- Message from Maya Perez MD sent at 7/14/2023  8:02 AM EDT -----  Please call the patient Friday July 14 and tell her she can take 3 more days of  the lasix /furosemide pills at one per day that she has at home. Her kidney blood tests improving. Have her call next week  with how her leg swelling is doing. Thanks.

## 2023-07-14 NOTE — RESULT ENCOUNTER NOTE
Please call the patient Friday July 14 and tell her she can take 3 more days of  the lasix /furosemide pills at one per day that she has at home. Her kidney blood tests improving. Have her call next week  with how her leg swelling is doing. Thanks.

## 2023-07-17 ENCOUNTER — TELEPHONE (OUTPATIENT)
Dept: PRIMARY CARE | Facility: CLINIC | Age: 88
End: 2023-07-17
Payer: MEDICARE

## 2023-07-17 NOTE — TELEPHONE ENCOUNTER
Pt aware of results. Pt reported her leg swelling while on the phone and Dr Perez recommended that she take the rest of the 10 day lasix Rx. Pt reported that her leg swelling came down, but not totally and the infection is no longer red. Dr Randall aware of this. Pt aware of recommendations

## 2023-07-21 LAB
FECAL OCCULT BLD IMMUNOASSAY: NORMAL
OCCULT BLOOD, STOOL X2: NORMAL
OCCULT BLOOD, STOOL X3: NORMAL

## 2023-07-21 NOTE — PROGRESS NOTES
"Subjective   Patient ID: Teresita Molina is a 88 y.o. female who presents for Follow-up (Pt here for follow up and review wounds on legs. Pt denies any new problems or concerns at this time. Pt was instructed to wash her stocking every day, but \"the redness is still there\").  LAST VISIT PLAN 7/12/2023  PATIENT'S DISCUSSION/SUMMARY:  Finish the antibiotic, doxycycline.  Stop the laisx/fuorsemide for now: we will see what your blood test shows and let you know if you can finish the pills you have.  Stool tests to check for hidden blood due to anemia: stool for occult blood-3 tests on 3 separate bowel movements and one stool FIT test-all samples go back to the lab and make sure your name and ate of birth and date of test are on each sample.   the equipment to obtain the sample in the lab today.  Blood test today to check kidney function after taking the lasix.      PROVIDER'S IMPRESSIONS:  Anemia, unspecified type  Stage 3a chronic kidney disease  Cellulitis of right lower extremity  Edema of both lower legs  Cellulitis of left lower leg  Skin lesion of right lower limb  Skin lesion of left leg  Atrial fibrillation status post cardioversion (CMS/[facility])  Anticoagulant long-term use  Orders Placed  Basic metabolic panel  Fecal Occult Blood Immunoassy  Occult Blood X 1, Stool  Occult blood x 1, stool  Occult Blood X 1, Stool  Encounters with Related Results  Lab (7/12/2023)  END INFO FROM PRIOR VISIT    HPI  She had doxycycline she finished around July 12th.  Redness right leg is back some. Legs are better-- still swell but not seeping and swelling is less.    She is done with lasix. Is still on the hydrochlorothiazide.  She states she had 15 tabs of lasix  and has 7 tabs left.    No increased sob  No fever chills cp  No injuries or falls  Going to wound center and has appts.    Re anemia, ob neg. No active bleeding or abd pain n/v  Hgb needs rechecked and needs stool fit test.    Review of Systems    Objective   Lab " "Results   Component Value Date    WBC 4.3 (L) 07/06/2023    HGB 10.1 (L) 07/06/2023    HCT 31.6 (L) 07/06/2023     07/06/2023    CHOL 152 08/26/2022    TRIG 114 08/26/2022    HDL 30.1 (A) 08/26/2022    ALT 7 07/06/2023    AST 12 07/06/2023     07/12/2023    K 3.5 07/12/2023     07/12/2023    CREATININE 1.12 (H) 07/12/2023    BUN 37 (H) 07/12/2023    CO2 32 07/12/2023    TSH 2.18 08/26/2022    HGBA1C 5.8 (A) 04/11/2023     Hgb spring 2023 10.9 and prior to that 11.1.    Physical ExamBP 124/62 (BP Location: Right arm, Patient Position: Sitting, BP Cuff Size: Adult)   Pulse 64   Resp 16   Ht 1.499 m (4' 11\")   Wt 71.2 kg (157 lb)   BMI 31.71 kg/m²   Patient looks well and is in no obvious distress.  HEENT:   Normocephalic, no facial asymmetry  Eyes: Sclera and conjunctiva are clear.  Lungs : RR normal. Clear to auscultation anterior, posterior and lateral. No rales, wheezes rhonchi or rubs. Good air exchange.  Heart: Regular today normal rate. 2/6 systolic murmur unchanged-has known afib.  Extremities: no upper extremity edema.   July 5 exam:Severe edema lower legs with multiple superficial open areas, shallow and mild pink discoloration c/w infection more than dermatitis, right leg much more than left.    No calf tenderness nnegative homans  Dorsal pedal edema as well.-END JULY 5 EXAM  Todays exam:  No redness left lower leg and no open or seeping wounds.  Right lower leg red ness pre tib no seeping or open wounds has one wound healing. Mid level  Still edema right more than left but less.    Warm to touch. She said redness was better tho not gone, and now has come back a little.   Musculoskeletal: no synovitis of joints seen. No new deformity.  Neuro: CN 2-12 intact. Alert, appropriate.  Ambulates independently.  No gross motor deficit.   No tremors.  Psych: normal mood and affect.  Skin: No rash, bruising petechiae or jaundice.    Assessment/Plan   Problem List Items Addressed This Visit  "      Anxiety disorder    Relevant Medications    sertraline (Zoloft) 50 mg tablet    Atrial fibrillation status post cardioversion (CMS/HCC)    Relevant Medications    apixaban (Eliquis) 2.5 mg tablet    chlorthalidone (Hygroton) 25 mg tablet    irbesartan (Avapro) 75 mg tablet    Esophageal reflux    Relevant Medications    pantoprazole (ProtoNix) 40 mg EC tablet     Other Visit Diagnoses       Cellulitis of right lower extremity    -  Primary    not completely resolved, will re treat with doxycycline    Relevant Medications    doxycycline (Vibramycin) 100 mg capsule    Cellulitis of left lower leg        resolved 8-1-23    Edema of both lower legs        elevate legs. compression socks when able.    Anemia, unspecified type        stool FIT test still needed and recheck cbc    Relevant Orders    Fecal Occult Blood Immunoassy    CBC              povidone iodine

## 2023-07-22 LAB — OCCULT BLOOD, STOOL X1: NEGATIVE

## 2023-08-01 ENCOUNTER — OFFICE VISIT (OUTPATIENT)
Dept: PRIMARY CARE | Facility: CLINIC | Age: 88
End: 2023-08-01
Payer: MEDICARE

## 2023-08-01 ENCOUNTER — LAB (OUTPATIENT)
Dept: LAB | Facility: LAB | Age: 88
End: 2023-08-01
Payer: MEDICARE

## 2023-08-01 VITALS
RESPIRATION RATE: 16 BRPM | HEIGHT: 59 IN | HEART RATE: 64 BPM | SYSTOLIC BLOOD PRESSURE: 124 MMHG | DIASTOLIC BLOOD PRESSURE: 62 MMHG | BODY MASS INDEX: 31.65 KG/M2 | WEIGHT: 157 LBS

## 2023-08-01 DIAGNOSIS — L03.116 CELLULITIS OF LEFT LOWER LEG: ICD-10-CM

## 2023-08-01 DIAGNOSIS — F41.9 ANXIETY DISORDER, UNSPECIFIED TYPE: Chronic | ICD-10-CM

## 2023-08-01 DIAGNOSIS — L03.115 CELLULITIS OF RIGHT LOWER EXTREMITY: Primary | ICD-10-CM

## 2023-08-01 DIAGNOSIS — K21.9 GASTROESOPHAGEAL REFLUX DISEASE, UNSPECIFIED WHETHER ESOPHAGITIS PRESENT: ICD-10-CM

## 2023-08-01 DIAGNOSIS — D64.9 ANEMIA, UNSPECIFIED TYPE: ICD-10-CM

## 2023-08-01 DIAGNOSIS — I48.91 ATRIAL FIBRILLATION STATUS POST CARDIOVERSION (MULTI): Chronic | ICD-10-CM

## 2023-08-01 DIAGNOSIS — R60.0 EDEMA OF BOTH LOWER LEGS: ICD-10-CM

## 2023-08-01 LAB
ERYTHROCYTE DISTRIBUTION WIDTH (RATIO) BY AUTOMATED COUNT: 14.5 % (ref 11.5–14.5)
ERYTHROCYTE MEAN CORPUSCULAR HEMOGLOBIN CONCENTRATION (G/DL) BY AUTOMATED: 31.5 G/DL (ref 32–36)
ERYTHROCYTE MEAN CORPUSCULAR VOLUME (FL) BY AUTOMATED COUNT: 86 FL (ref 80–100)
ERYTHROCYTES (10*6/UL) IN BLOOD BY AUTOMATED COUNT: 3.96 X10E12/L (ref 4–5.2)
HEMATOCRIT (%) IN BLOOD BY AUTOMATED COUNT: 34 % (ref 36–46)
HEMOGLOBIN (G/DL) IN BLOOD: 10.7 G/DL (ref 12–16)
LEUKOCYTES (10*3/UL) IN BLOOD BY AUTOMATED COUNT: 4.1 X10E9/L (ref 4.4–11.3)
NRBC (PER 100 WBCS) BY AUTOMATED COUNT: 0 /100 WBC (ref 0–0)
PLATELETS (10*3/UL) IN BLOOD AUTOMATED COUNT: 154 X10E9/L (ref 150–450)

## 2023-08-01 PROCEDURE — 85027 COMPLETE CBC AUTOMATED: CPT

## 2023-08-01 PROCEDURE — 36415 COLL VENOUS BLD VENIPUNCTURE: CPT

## 2023-08-01 PROCEDURE — 3078F DIAST BP <80 MM HG: CPT | Performed by: INTERNAL MEDICINE

## 2023-08-01 PROCEDURE — 3074F SYST BP LT 130 MM HG: CPT | Performed by: INTERNAL MEDICINE

## 2023-08-01 PROCEDURE — 1159F MED LIST DOCD IN RCRD: CPT | Performed by: INTERNAL MEDICINE

## 2023-08-01 PROCEDURE — 99214 OFFICE O/P EST MOD 30 MIN: CPT | Performed by: INTERNAL MEDICINE

## 2023-08-01 PROCEDURE — 1036F TOBACCO NON-USER: CPT | Performed by: INTERNAL MEDICINE

## 2023-08-01 PROCEDURE — 1126F AMNT PAIN NOTED NONE PRSNT: CPT | Performed by: INTERNAL MEDICINE

## 2023-08-01 PROCEDURE — 1160F RVW MEDS BY RX/DR IN RCRD: CPT | Performed by: INTERNAL MEDICINE

## 2023-08-01 RX ORDER — CHLORTHALIDONE 25 MG/1
25 TABLET ORAL DAILY
Qty: 90 TABLET | Refills: 2 | Status: SHIPPED | OUTPATIENT
Start: 2023-08-01

## 2023-08-01 RX ORDER — SERTRALINE HYDROCHLORIDE 50 MG/1
75 TABLET, FILM COATED ORAL DAILY
Qty: 140 TABLET | Refills: 2 | Status: SHIPPED | OUTPATIENT
Start: 2023-08-01

## 2023-08-01 RX ORDER — IRBESARTAN 75 MG/1
75 TABLET ORAL DAILY
Qty: 90 TABLET | Refills: 2 | Status: SHIPPED | OUTPATIENT
Start: 2023-08-01

## 2023-08-01 RX ORDER — PANTOPRAZOLE SODIUM 40 MG/1
40 TABLET, DELAYED RELEASE ORAL
Qty: 90 TABLET | Refills: 2 | Status: SHIPPED | OUTPATIENT
Start: 2023-08-01

## 2023-08-01 RX ORDER — DOXYCYCLINE 100 MG/1
100 CAPSULE ORAL 2 TIMES DAILY
Qty: 20 CAPSULE | Refills: 0 | Status: SHIPPED | OUTPATIENT
Start: 2023-08-01 | End: 2023-08-11

## 2023-08-01 ASSESSMENT — PAIN SCALES - GENERAL: PAINLEVEL: 0-NO PAIN

## 2023-08-01 NOTE — PATIENT INSTRUCTIONS
Restart doxycycline for redness right lower leg, take one twice per day about 8 hours aprart with 8 oz glass of water and some non dairy food.    Stay off the furosemide/lasix: take one per day IF legs swell up more than usual.    Take chlorthalidone every day, do not skip sundays.    Blood test today.  The one stool test turned in was negative for hidden blood. We need at least one more test, please  and then turn in.    Keep wound center appts, legs are looking better.    Keep appt here in October, call if anything is worse or if you end up using all of the furosemide pills and legs are still swelling.

## 2023-08-04 ENCOUNTER — LAB (OUTPATIENT)
Dept: LAB | Facility: LAB | Age: 88
End: 2023-08-04
Payer: MEDICARE

## 2023-08-04 DIAGNOSIS — D64.9 ANEMIA, UNSPECIFIED TYPE: ICD-10-CM

## 2023-08-04 PROCEDURE — 82274 ASSAY TEST FOR BLOOD FECAL: CPT

## 2023-08-05 LAB — FECAL OCCULT BLD IMMUNOASSAY: NEGATIVE

## 2023-10-18 ENCOUNTER — LAB (OUTPATIENT)
Dept: LAB | Facility: LAB | Age: 88
End: 2023-10-18
Payer: MEDICARE

## 2023-10-18 ENCOUNTER — OFFICE VISIT (OUTPATIENT)
Dept: PRIMARY CARE | Facility: CLINIC | Age: 88
End: 2023-10-18
Payer: MEDICARE

## 2023-10-18 VITALS
WEIGHT: 158 LBS | SYSTOLIC BLOOD PRESSURE: 120 MMHG | RESPIRATION RATE: 18 BRPM | HEIGHT: 59 IN | DIASTOLIC BLOOD PRESSURE: 66 MMHG | HEART RATE: 64 BPM | BODY MASS INDEX: 31.85 KG/M2

## 2023-10-18 DIAGNOSIS — D64.9 ANEMIA, UNSPECIFIED TYPE: ICD-10-CM

## 2023-10-18 DIAGNOSIS — I48.91 ATRIAL FIBRILLATION STATUS POST CARDIOVERSION (MULTI): ICD-10-CM

## 2023-10-18 DIAGNOSIS — E61.1 IRON DEFICIENCY: ICD-10-CM

## 2023-10-18 DIAGNOSIS — Z23 NEED FOR INFLUENZA VACCINATION: ICD-10-CM

## 2023-10-18 DIAGNOSIS — J30.9 ALLERGIC RHINITIS, UNSPECIFIED SEASONALITY, UNSPECIFIED TRIGGER: Primary | ICD-10-CM

## 2023-10-18 DIAGNOSIS — M19.90 ARTHRITIS: ICD-10-CM

## 2023-10-18 DIAGNOSIS — I10 BENIGN ESSENTIAL HYPERTENSION: ICD-10-CM

## 2023-10-18 DIAGNOSIS — Z23 NEED FOR VACCINATION WITH 13-POLYVALENT PNEUMOCOCCAL CONJUGATE VACCINE: ICD-10-CM

## 2023-10-18 DIAGNOSIS — R60.0 EDEMA OF BOTH LOWER LEGS: ICD-10-CM

## 2023-10-18 DIAGNOSIS — N18.31 STAGE 3A CHRONIC KIDNEY DISEASE (MULTI): ICD-10-CM

## 2023-10-18 DIAGNOSIS — I35.0 AORTIC VALVE STENOSIS, MODERATE: ICD-10-CM

## 2023-10-18 PROCEDURE — 36415 COLL VENOUS BLD VENIPUNCTURE: CPT

## 2023-10-18 PROCEDURE — 99214 OFFICE O/P EST MOD 30 MIN: CPT | Performed by: INTERNAL MEDICINE

## 2023-10-18 PROCEDURE — 3074F SYST BP LT 130 MM HG: CPT | Performed by: INTERNAL MEDICINE

## 2023-10-18 PROCEDURE — G0008 ADMIN INFLUENZA VIRUS VAC: HCPCS | Performed by: INTERNAL MEDICINE

## 2023-10-18 PROCEDURE — 1126F AMNT PAIN NOTED NONE PRSNT: CPT | Performed by: INTERNAL MEDICINE

## 2023-10-18 PROCEDURE — 1160F RVW MEDS BY RX/DR IN RCRD: CPT | Performed by: INTERNAL MEDICINE

## 2023-10-18 PROCEDURE — 83540 ASSAY OF IRON: CPT

## 2023-10-18 PROCEDURE — 1036F TOBACCO NON-USER: CPT | Performed by: INTERNAL MEDICINE

## 2023-10-18 PROCEDURE — 90662 IIV NO PRSV INCREASED AG IM: CPT | Performed by: INTERNAL MEDICINE

## 2023-10-18 PROCEDURE — 85025 COMPLETE CBC W/AUTO DIFF WBC: CPT

## 2023-10-18 PROCEDURE — 3078F DIAST BP <80 MM HG: CPT | Performed by: INTERNAL MEDICINE

## 2023-10-18 PROCEDURE — 1159F MED LIST DOCD IN RCRD: CPT | Performed by: INTERNAL MEDICINE

## 2023-10-18 PROCEDURE — 83550 IRON BINDING TEST: CPT

## 2023-10-18 PROCEDURE — 80069 RENAL FUNCTION PANEL: CPT

## 2023-10-18 RX ORDER — PNEUMOCOCCAL 13-VALENT CONJUGATE VACCINE 2.2; 2.2; 2.2; 2.2; 2.2; 4.4; 2.2; 2.2; 2.2; 2.2; 2.2; 2.2; 2.2 UG/.5ML; UG/.5ML; UG/.5ML; UG/.5ML; UG/.5ML; UG/.5ML; UG/.5ML; UG/.5ML; UG/.5ML; UG/.5ML; UG/.5ML; UG/.5ML; UG/.5ML
0.5 INJECTION, SUSPENSION INTRAMUSCULAR ONCE
Qty: 0.5 ML | Refills: 0 | Status: SHIPPED | OUTPATIENT
Start: 2023-10-18 | End: 2023-10-18

## 2023-10-18 RX ORDER — FUROSEMIDE 40 MG/1
TABLET ORAL
Qty: 15 TABLET | Refills: 0
Start: 2023-10-18

## 2023-10-18 RX ORDER — AZELASTINE 1 MG/ML
2 SPRAY, METERED NASAL 2 TIMES DAILY PRN
Qty: 30 ML | Refills: 1 | Status: SHIPPED | OUTPATIENT
Start: 2023-10-18

## 2023-10-18 RX ORDER — IRON POLYSACCHARIDE COMPLEX 150 MG
150 CAPSULE ORAL DAILY
Qty: 90 CAPSULE | Refills: 2 | Status: SHIPPED | OUTPATIENT
Start: 2023-10-18 | End: 2024-02-27 | Stop reason: SINTOL

## 2023-10-18 ASSESSMENT — PAIN SCALES - GENERAL: PAINLEVEL: 0-NO PAIN

## 2023-10-18 NOTE — PATIENT INSTRUCTIONS
Flu shot done today.    Recommend updated covid booster as soon as you can.    Later in the fall Prevnar 13 pneumonia vaccine.    Consider the RSV vaccine later in the fall,  would do these all at separate times, a week apart or more.    These others need to be obtained at your pharmacy due to your insurance preference.    Schedule echocardiogram ordered by Dr Sim to check the aortic valve stenosis. It should be done before you see him on January 3rd.    Blood test today and will try to re do the stool FIT test.    Start niferex iron polysaccharide one per day.    Follow up in February.

## 2023-10-18 NOTE — PROGRESS NOTES
Subjective   Patient ID: Teresita Molina is a 89 y.o. female who presents for Follow-up (Pt here for follow up and review. Flu shot today. ).  LAST VISIT PLAN 8/1/23  Problem List Items Addressed This Visit         Anxiety disorder     Relevant Medications     sertraline (Zoloft) 50 mg tablet     Atrial fibrillation status post cardioversion (CMS/HCC)     Relevant Medications     apixaban (Eliquis) 2.5 mg tablet     chlorthalidone (Hygroton) 25 mg tablet     irbesartan (Avapro) 75 mg tablet     Esophageal reflux     Relevant Medications     pantoprazole (ProtoNix) 40 mg EC tablet      Other Visit Diagnoses         Cellulitis of right lower extremity    -  Primary     not completely resolved, will re treat with doxycycline     Relevant Medications     doxycycline (Vibramycin) 100 mg capsule     Cellulitis of left lower leg         resolved 8-1-23     Edema of both lower legs         elevate legs. compression socks when able.     Anemia, unspecified type         stool FIT test still needed and recheck cbc     Relevant Orders     Fecal Occult Blood Immunoassy     CBC     END INFO FROM PRIOR VISIT  HPI  Here to follow up on edema htn with ckd,.paf, anticoag use,asthma, cervical myelopathy and perip neuropathy which affect balance. She is doing well.leg swelling is gone, no asthma issues. No falls. Uses her cane or holds on.    Anemia, not taking iron due to gi upset, consider alternative form, needs lab rechecked  No blood in stool or urine or melena and never did fit test, lab had an error or something.  Scribe Transcription:  She denies taking furosemide and states she doesn’t have any at home.     She denies any abdominal pain and heartburn. She states her bowel habits are improving stating she had diarrhea but she is starting to have more formed stools. She denies taking iron.     We had a discussion about why she needs Prevnar 13 in addition to the prior P23.     She had an occult blood stool test that was negative  in July 2021. She had two ordered after that that were canceled.     Her last Cr was 1.33 which is elevated for her. Her last A1c was 5.8 in April.     Cardiac: No CP , palpitations, increased fournier  Resp: No sob, wheezing, cough  Extremities: No leg or ankle swelling, no claudication  Neuro: No dizziness, syncope, blurred vision. No new headaches.     Anticoagulant use: Denies seeing blood in urine or stool. No unusual bruising. No nose bleeds. No head trauma or unusual headaches since last visit.  Lab Results   Component Value Date    WBC 4.1 (L) 08/01/2023    HGB 10.7 (L) 08/01/2023    HCT 34.0 (L) 08/01/2023    MCV 86 08/01/2023     08/01/2023         Asthma: No complaints.  Use of rescue inhaler:   Medications : Taking them regularly , no side effects.  No sob , cough, sputum, wheezing. No soboe.    Paf-no palpitations   Scribe Attestation  By signing my name below, I, Marshall Reyez, Scribe   attest that this documentation has been prepared under the direction and in the presence of Maya Perez MD.         Review of Systems  No abd pain or hb  No blood stool in urine  Was having diarrhea, now more forming.  She is not taking iron but has it at home. Bothers her stomach. Discussed trying iron polyscaccarides if still anemic.  Current Outpatient Medications   Medication Instructions    apixaban (ELIQUIS) 2.5 mg, oral, Every 12 hours    azelastine (Astelin) 137 mcg (0.1 %) nasal spray 2 sprays, Each Nostril, 2 times daily PRN    chlorthalidone (HYGROTON) 25 mg, oral, Daily    cholecalciferol (VITAMIN D-3) 50 mcg, oral, Daily    cyanocobalamin, vitamin B-12, 1,000 mcg tablet, sublingual 1 tablet, sublingual, Daily    furosemide (Lasix) 40 mg tablet Take one tab every am For 3 days and then as directed.    irbesartan (AVAPRO) 75 mg, oral, Daily    magnesium glycinate-mag oxide 120 mg magnesium capsule 1 tablet, oral, Daily    pantoprazole (PROTONIX) 40 mg, oral, Daily before breakfast    sertraline  "(ZOLOFT) 75 mg, oral, Daily     Allergies   Allergen Reactions    Amoxicillin Shortness of breath and Swelling     Reaction Date:Unknown    Codeine Shortness of breath    Penicillins Shortness of breath     Reaction Date:Unknown    Montelukast Cough    Morphine Nausea Only     Reaction Date:Approx [id number]    Opioids - Morphine Analogues Swelling    Spironolactone Nausea Only    Tramadol Nausea Only     Objective   Lab Results   Component Value Date    WBC 4.1 (L) 08/01/2023    HGB 10.7 (L) 08/01/2023    HCT 34.0 (L) 08/01/2023     08/01/2023    CHOL 152 08/26/2022    TRIG 114 08/26/2022    HDL 30.1 (A) 08/26/2022    ALT 7 07/06/2023    AST 12 07/06/2023     07/12/2023    K 3.5 07/12/2023     07/12/2023    CREATININE 1.12 (H) 07/12/2023    BUN 37 (H) 07/12/2023    CO2 32 07/12/2023    TSH 2.18 08/26/2022    HGBA1C 5.8 (A) 04/11/2023     par  Physical ExamBP 120/66 (BP Location: Left arm, Patient Position: Sitting, BP Cuff Size: Adult)   Pulse 64   Resp 18   Ht 1.499 m (4' 11\")   Wt 71.7 kg (158 lb)   BMI 31.91 kg/m²       12/5/2022     8:46 AM 1/17/2023     9:13 AM 4/11/2023     8:35 AM 7/5/2023     4:57 PM 7/12/2023    11:14 AM 8/1/2023     8:36 AM 10/18/2023     2:25 PM   Vitals   Systolic 132 154 116 116 124 124 120   Diastolic 75 69 68 54 62 62 66   Heart Rate 72 76 64 64 60 64 64   Resp 18  18 18 16 16 18   Height (in)  1.499 m (4' 11\") 1.549 m (5' 1\") 1.473 m (4' 10\") 1.473 m (4' 10\") 1.499 m (4' 11\") 1.499 m (4' 11\")   Weight (lb) 155 156 155 158 153 157 158   BMI 31.31 kg/m2 31.51 kg/m2 29.29 kg/m2 33.02 kg/m2 31.98 kg/m2 31.71 kg/m2 31.91 kg/m2   BSA (m2) 1.71 m2 1.72 m2 1.74 m2 1.71 m2 1.69 m2 1.72 m2 1.73 m2   Visit Report   Report Report Report Report Report       Patient looks well and is in no obvious distress.  HEENT:   Normocephalic, no facial asymmetry  Eyes: Sclera and conjunctiva are clear.  Neck: No adenopathy cervical (Ant/post/lat), no Supraclavicular nodes.   Thyroid " normal.   Carotid pulses normal, no carotid bruits.  Lungs : RR normal. Clear to auscultation anterior, posterior and lateral. No rales, wheezes rhonchi or rubs. Good air exchange.  Heart: RRR. No , gallop, click or rub. Normal rate afib, 2/6 murmur from AS and no radiation or pitting edema.   Abdomen: Bowel sounds normal, No bruits. No pulsatile mass. No hepatosplenomegaly, masses or tenderness. Soft, no guarding.  Vascular:  Posterior tibialis and dorsalis pedis pulses within normal limits bilaterally.   Extremities: no upper extremity edema. No lower extremity edema.  She had cellulitis in her right lower leg that has resolved.   Musculoskeletal: no synovitis of joints seen. No new deformity.  Neuro: CN 2-12 intact. Alert, appropriate.   Ambulates independently.  No gross motor deficit.   No tremors.  Psych: normal mood and affect.  Skin: No rash, bruising petechiae or jaundice.      Teresita was seen today for follow-up.  Diagnoses and all orders for this visit:  Allergic rhinitis, unspecified seasonality, unspecified trigger (Primary)  -     azelastine (Astelin) 137 mcg (0.1 %) nasal spray; Administer 2 sprays into each nostril 2 times a day as needed for rhinitis.  Need for influenza vaccination  -     Flu vaccine, quadrivalent, high-dose, preservative free, age 65y+ (FLUZONE)  Edema of both lower legs  -     furosemide (Lasix) 40 mg tablet; Keep on med list in case needed for prn use for leg swelling in the future. Currently not needing oct 2023.  Need for vaccination with 13-polyvalent pneumococcal conjugate vaccine  -     pneumococcal conjugate (Prevnar 13, PF,) 0.5 mL vaccine; Inject 0.5 mL into the muscle 1 time for 1 dose.  Iron deficiency  -     iron polysaccharides (Nu-Iron,Niferex) 150 mg iron capsule; Take 1 capsule (150 mg) by mouth once daily. Avoid taking with dairy products, but take with food.  -     Fecal Occult Blood Immunoassy; Future  Anemia, unspecified type  -     iron polysaccharides  (Nu-Iron,Niferex) 150 mg iron capsule; Take 1 capsule (150 mg) by mouth once daily. Avoid taking with dairy products, but take with food.  -     Fecal Occult Blood Immunoassy; Future  -     CBC and Auto Differential; Future  -     Iron and TIBC; Future  -     Renal function panel; Future  Stage 3a chronic kidney disease (CMS/HCC)  Benign essential hypertension  Arthritis  Atrial fibrillation status post cardioversion (CMS/HCC)  Aortic valve stenosis, moderate  Asymp and murmur same, needs annual echo's and annual cardio follo wup on this and paf. Stable          See wrap up section for patient instructions and  additional treatment plan.   Flu shot done today.    Recommend updated covid booster as soon as you can.    Later in the fall Prevnar 13 pneumonia vaccine.    Consider the RSV vaccine later in the fall,  would do these all at separate times, a week apart or more.    These others need to be obtained at your pharmacy due to your insurance preference.    Schedule echocardiogram ordered by Dr Sim to check the aortic valve stenosis. It should be done before you see him on January 3rd.    Blood test today and will try to re do the stool FIT test.    Start niferex iron polysaccharide one per day.    Follow up in February.  ----------------  Note HCC is pulling thrombocytopenia- her platelet count was ok, resolved this issue.

## 2023-10-19 LAB
ALBUMIN SERPL BCP-MCNC: 4.3 G/DL (ref 3.4–5)
ANION GAP SERPL CALC-SCNC: 16 MMOL/L (ref 10–20)
BASOPHILS # BLD AUTO: 0.01 X10*3/UL (ref 0–0.1)
BASOPHILS NFR BLD AUTO: 0.2 %
BUN SERPL-MCNC: 22 MG/DL (ref 6–23)
CALCIUM SERPL-MCNC: 9.5 MG/DL (ref 8.6–10.6)
CHLORIDE SERPL-SCNC: 102 MMOL/L (ref 98–107)
CO2 SERPL-SCNC: 30 MMOL/L (ref 21–32)
CREAT SERPL-MCNC: 0.9 MG/DL (ref 0.5–1.05)
EOSINOPHIL # BLD AUTO: 0.05 X10*3/UL (ref 0–0.4)
EOSINOPHIL NFR BLD AUTO: 1.1 %
ERYTHROCYTE [DISTWIDTH] IN BLOOD BY AUTOMATED COUNT: 14.7 % (ref 11.5–14.5)
GFR SERPL CREATININE-BSD FRML MDRD: 61 ML/MIN/1.73M*2
GLUCOSE SERPL-MCNC: 86 MG/DL (ref 74–99)
HCT VFR BLD AUTO: 35.1 % (ref 36–46)
HGB BLD-MCNC: 10.9 G/DL (ref 12–16)
IMM GRANULOCYTES # BLD AUTO: 0.01 X10*3/UL (ref 0–0.5)
IMM GRANULOCYTES NFR BLD AUTO: 0.2 % (ref 0–0.9)
IRON SATN MFR SERPL: 13 % (ref 25–45)
IRON SERPL-MCNC: 43 UG/DL (ref 35–150)
LYMPHOCYTES # BLD AUTO: 1.27 X10*3/UL (ref 0.8–3)
LYMPHOCYTES NFR BLD AUTO: 27.9 %
MCH RBC QN AUTO: 27.1 PG (ref 26–34)
MCHC RBC AUTO-ENTMCNC: 31.1 G/DL (ref 32–36)
MCV RBC AUTO: 87 FL (ref 80–100)
MONOCYTES # BLD AUTO: 0.53 X10*3/UL (ref 0.05–0.8)
MONOCYTES NFR BLD AUTO: 11.6 %
NEUTROPHILS # BLD AUTO: 2.69 X10*3/UL (ref 1.6–5.5)
NEUTROPHILS NFR BLD AUTO: 59 %
NRBC BLD-RTO: 0 /100 WBCS (ref 0–0)
PHOSPHATE SERPL-MCNC: 3.6 MG/DL (ref 2.5–4.9)
PLATELET # BLD AUTO: 154 X10*3/UL (ref 150–450)
PMV BLD AUTO: 12 FL (ref 7.5–11.5)
POTASSIUM SERPL-SCNC: 4.1 MMOL/L (ref 3.5–5.3)
RBC # BLD AUTO: 4.02 X10*6/UL (ref 4–5.2)
SODIUM SERPL-SCNC: 144 MMOL/L (ref 136–145)
TIBC SERPL-MCNC: 342 UG/DL (ref 240–445)
UIBC SERPL-MCNC: 299 UG/DL (ref 110–370)
WBC # BLD AUTO: 4.6 X10*3/UL (ref 4.4–11.3)

## 2023-10-22 DIAGNOSIS — D64.9 ANEMIA, UNSPECIFIED TYPE: ICD-10-CM

## 2023-10-22 PROBLEM — D69.6 THROMBOCYTOPENIA (CMS-HCC): Status: RESOLVED | Noted: 2023-04-10 | Resolved: 2023-10-22

## 2023-11-04 ENCOUNTER — LAB REQUISITION (OUTPATIENT)
Dept: LAB | Facility: HOSPITAL | Age: 88
End: 2023-11-04
Payer: MEDICARE

## 2024-01-03 ENCOUNTER — HOSPITAL ENCOUNTER (OUTPATIENT)
Dept: CARDIOLOGY | Facility: CLINIC | Age: 89
Discharge: HOME | End: 2024-01-03
Payer: MEDICARE

## 2024-01-03 DIAGNOSIS — I35.0 NONRHEUMATIC AORTIC (VALVE) STENOSIS: ICD-10-CM

## 2024-01-03 PROCEDURE — 93306 TTE W/DOPPLER COMPLETE: CPT | Performed by: STUDENT IN AN ORGANIZED HEALTH CARE EDUCATION/TRAINING PROGRAM

## 2024-01-03 PROCEDURE — 93306 TTE W/DOPPLER COMPLETE: CPT

## 2024-01-08 LAB
AORTIC VALVE MEAN GRADIENT: 20.7
AORTIC VALVE PEAK VELOCITY: 3.23
AV PEAK GRADIENT: 41.7
AVA (PEAK VEL): 1.17
AVA (VTI): 1.25
EJECTION FRACTION APICAL 4 CHAMBER: 73.1
EJECTION FRACTION: 76
LEFT ATRIUM VOLUME AREA LENGTH INDEX BSA: 42.8
LEFT VENTRICLE INTERNAL DIMENSION DIASTOLE: 3.35 (ref 3.5–6)
LEFT VENTRICULAR OUTFLOW TRACT DIAMETER: 2.03
MITRAL VALVE E/E' RATIO: 20.09
RIGHT VENTRICLE FREE WALL PEAK S': 10
RIGHT VENTRICLE PEAK SYSTOLIC PRESSURE: 48.7
TRICUSPID ANNULAR PLANE SYSTOLIC EXCURSION: 1

## 2024-02-23 NOTE — PROGRESS NOTES
Subjective   Patient ID: Teresita Molina is a 89 y.o. female who presents for Follow-up (Patient here for follow up visit.).  LAST VISIT PLAN 10/18/23  Diagnoses and all orders for this visit:  Allergic rhinitis, unspecified seasonality, unspecified trigger (Primary)  -     azelastine (Astelin) 137 mcg (0.1 %) nasal spray; Administer 2 sprays into each nostril 2 times a day as needed for rhinitis.  Need for influenza vaccination  -     Flu vaccine, quadrivalent, high-dose, preservative free, age 65y+ (FLUZONE)  Edema of both lower legs  -     furosemide (Lasix) 40 mg tablet; Keep on med list in case needed for prn use for leg swelling in the future. Currently not needing oct 2023.  Need for vaccination with 13-polyvalent pneumococcal conjugate vaccine  -     pneumococcal conjugate (Prevnar 13, PF,) 0.5 mL vaccine; Inject 0.5 mL into the muscle 1 time for 1 dose.  Iron deficiency  -     iron polysaccharides (Nu-Iron,Niferex) 150 mg iron capsule; Take 1 capsule (150 mg) by mouth once daily. Avoid taking with dairy products, but take with food.  -     Fecal Occult Blood Immunoassy; Future  Anemia, unspecified type  -     iron polysaccharides (Nu-Iron,Niferex) 150 mg iron capsule; Take 1 capsule (150 mg) by mouth once daily. Avoid taking with dairy products, but take with food.  -     Fecal Occult Blood Immunoassy; Future  -     CBC and Auto Differential; Future  -     Iron and TIBC; Future  -     Renal function panel; Future  Stage 3a chronic kidney disease (CMS/HCC)  Benign essential hypertension  Arthritis  Atrial fibrillation status post cardioversion (CMS/HCC)  Aortic valve stenosis, moderate  Asymp and murmur same, needs annual echo's and annual cardio follo wup on this and paf. Stable  See wrap up section for patient instructions and  additional treatment plan.   Flu shot done today.  Recommend updated covid booster as soon as you can.  Later in the fall Prevnar 13 pneumonia vaccine.  Consider the RSV vaccine  later in the fall,  would do these all at separate times, a week apart or more.  These others need to be obtained at your pharmacy due to your insurance preference.  Schedule echocardiogram ordered by Dr Sim to check the aortic valve stenosis. It should be done before you see him on January 3rd.  Blood test today and will try to re do the stool FIT test.  Start niferex iron polysaccharide one per day.  Follow up in February.--------------  Note HCC is pulling thrombocytopenia- her platelet count was ok, resolved this issue.  END INFO FROM PRIOR VISIT  HPI  Here for follow-up on hypertension chronic leg edema spinal stenosis and myelopathy causing balance issues, neuropathy, GERD, and reactive airways.  She had a URI for the past 2 weeks but it is improving.  Per nurse pt has uri symptoms. So she has a mask.  She notes x 2 weeks getting better never fever transient sore throat and hoarse -she does have corinc haorseness  Non prod cough  Not much wheezing  No fever  Not using inhaler she does use her Astelin nasal spray at times    Leg swelling better    Viewed vaccine discussion from last visit.  She does not want a covid booster and she does not want an rsv vaccine, current rsv is 4% in the area .  She also chose not to get the Prevnar 13 that was recommended.    She has atrial fibrillation and is on an anticoagulant.  She denies chest pain palpitations denies shortness of breath dizziness falls or syncope.  Shortness of breath on exertion is the last 2 stops of the stairs when she goes up them and this has been stable for years  Anticoagulant use: Denies seeing blood in urine or stool. No unusual bruising. No nose bleeds. No head trauma or unusual headaches since last visit. Low dose apixiban  Lab Results   Component Value Date    WBC 4.6 02/27/2024    HGB 11.1 (L) 02/27/2024    HCT 36.0 02/27/2024    MCV 87 02/27/2024     02/27/2024   she has an anemia and she was to get a follow-up blood test in  December or early January, I asked her to do this today.  She forgot to get a cbc recheck December January. Will do today.  Reviewed importance of hydration    Had echo showing moderate aortic valve stenosis    Has a history of a possible calcified small splenic artery aneurysm noted on a routine x-ray in the face of moderate least severe intra-abdominal vascular calcifications in 2012 and she has never had a complaint referable to this -she saw dr gab marr at [facility] for this may 2012and a ct showed 1.1 cm splenic arterty aneurysm.They advised her to repeat a ct in 2 years and she chose not to pursue this any further.  Review of Systems  See ROS in HPI  She complains of some right hip pain at times but not bad  Current Outpatient Medications   Medication Instructions    apixaban (ELIQUIS) 2.5 mg, oral, Every 12 hours    azelastine (Astelin) 137 mcg (0.1 %) nasal spray 2 sprays, Each Nostril, 2 times daily PRN    azithromycin (Zithromax) 250 mg tablet Take 2 tablets (500 mg) by mouth once daily for 1 day, THEN 1 tablet (250 mg) once daily for 4 days. Take 2 tabs (500 mg) by mouth today, than 1 daily for 4 days..    chlorthalidone (HYGROTON) 25 mg, oral, Daily    cholecalciferol (VITAMIN D-3) 50 mcg, oral, Daily    cyanocobalamin, vitamin B-12, 1,000 mcg tablet, sublingual 1 tablet, sublingual, Daily    furosemide (Lasix) 40 mg tablet Keep on med list in case needed for prn use for leg swelling in the future. Currently not needing oct 2023.    irbesartan (AVAPRO) 75 mg, oral, Daily    magnesium glycinate-mag oxide 120 mg magnesium capsule 1 tablet, oral, Daily    pantoprazole (PROTONIX) 40 mg, oral, Daily before breakfast    sertraline (ZOLOFT) 75 mg, oral, Daily     Allergies   Allergen Reactions    Amoxicillin Shortness of breath and Swelling     Reaction Date:Unknown    Codeine Shortness of breath    Penicillins Shortness of breath     Reaction Date:Unknown    Montelukast Cough    Morphine Nausea Only     Reaction  "Date:Approx 03Sep2009    Opioids - Morphine Analogues Swelling    Spironolactone Nausea Only    Tramadol Nausea Only     Objective   Lab Results   Component Value Date    WBC 4.6 02/27/2024    HGB 11.1 (L) 02/27/2024    HCT 36.0 02/27/2024     02/27/2024    CHOL 152 08/26/2022    TRIG 114 08/26/2022    HDL 30.1 (A) 08/26/2022    ALT 7 07/06/2023    AST 12 07/06/2023     02/27/2024    K 4.6 02/27/2024     02/27/2024    CREATININE 0.99 02/27/2024    BUN 25 (H) 02/27/2024    CO2 34 (H) 02/27/2024    TSH 2.18 08/26/2022    HGBA1C 5.8 (A) 04/11/2023   Echo jan 2024 from dr webb d/w her  CONCLUSIONS:   1. Left ventricular systolic function is hyperdynamic with a 65-70% estimated ejection fraction.   2. There is moderate concentric left ventricular hypertrophy.   3. Absent A-wave on MV spectral Doppler tracing, consistent with atrial fibrillation.   4. There is low normal right ventricular systolic function.   5. The left atrium is moderate to severely dilated.   6. The right atrium is moderately dilated.   7. There is a small pericardial effusion.   8. There is moderate mitral annular calcification.   9. Mild to moderately elevated right ventricular systolic pressure.  10. Aortic valve appears abnormal.  11. Moderate aortic valve stenosis.  12. Atrial septal aneurysm present.  13. Moderate tricuspid regurgitation visualized.ent looks well and is in no obvious distress.  par  Physical ExamBP 132/67   Pulse 70   Temp 36.4 °C (97.6 °F) (Oral)   Ht 1.499 m (4' 11\")   Wt 70.8 kg (156 lb)   BMI 31.51 kg/m²       1/17/2023     9:13 AM 4/11/2023     8:35 AM 7/5/2023     4:57 PM 7/12/2023    11:14 AM 8/1/2023     8:36 AM 10/18/2023     2:25 PM 2/27/2024     8:25 AM   Vitals   Systolic 154 116 116 124 124 120 132   Diastolic 69 68 54 62 62 66 67   Heart Rate 76 64 64 60 64 64 70   Temp       36.4 °C (97.6 °F)   Resp  18 18 16 16 18    Height (in) 1.499 m (4' 11\") 1.549 m (5' 1\") 1.473 m (4' 10\") 1.473 m " "(4' 10\") 1.499 m (4' 11\") 1.499 m (4' 11\") 1.499 m (4' 11\")   Weight (lb) 156 155 158 153 157 158 156   BMI 31.51 kg/m2 29.29 kg/m2 33.02 kg/m2 31.98 kg/m2 31.71 kg/m2 31.91 kg/m2 31.51 kg/m2   BSA (m2) 1.72 m2 1.74 m2 1.71 m2 1.69 m2 1.72 m2 1.73 m2 1.72 m2   Visit Report  Report Report Report Report Report Report       HEENT: ears with scattered cerumen, dry, tm's glimpsed and not red.  Orpharynx and post pharynx completely normal and palate moves well  No sinus tenderness on palpation today, or swelling.  She sounds very nasally congested. Says has clear nasal drainage bilaterally, uses astelin but not taking claritin or mucinex now  Normocephalic, no facial asymmetry  Eyes: Sclera and conjunctiva are clear.  Neck: No adenopathy cervical (Ant/post/lat), no Supraclavicular nodes.   Thyroid normal.   Carotid pulses normal, no carotid bruits.  Lungs : RR normal. Clear to auscultation anterior, posterior and lateral. No rales, wheezes rhonchi or rubs. Good air exchange.  Clear with forced expiration as well.  Heart: mildly irregular, rate good 2-3/6 systolic murmur unchnaged. Has moderate as. No , gallop, click or rub.  Abd bs nl no bruits no femoral bruits excellent fem pulse right no abd tenderness mass or megaly  Vascular:  Posterior tibialis pulses within normal limits bilaterally.   Extremities: no upper extremity edema. 1-2 left lower leg swelling, minmal right, no dermatitis but has some pink skin macules that are not worrisome. She notes this is all unchanged and usually she has knee high comp socks on  No calf tenderness and neg homans   Musculoskeletal: no synovitis of joints seen. No new deformity. Bilateral tkrs and both knees have some pop fluid chronic  Right hip mod decr rotational rom, left hip normal, neither have pain with moviement and no lateral tenderness right hip suspect djd, she declines xray or param  Neuro: CN 2-12 intact. Alert, appropriate.   Ambulates independently with cane, balance. Does " "not want  No gross motor deficit.   No tremors.  Psych: normal mood and affect.  Skin: No rash, bruising petechiae or jaundice.        Teresita was seen today for follow-up.  Diagnoses and all orders for this visit:  URI with cough and congestion (Primary)  -     azithromycin (Zithromax) 250 mg tablet; Take 2 tablets (500 mg) by mouth once daily for 1 day, THEN 1 tablet (250 mg) once daily for 4 days. Take 2 tabs (500 mg) by mouth today, than 1 daily for 4 days..  Atrial fibrillation status post cardioversion (CMS/HCC)  Splenic artery aneurysm (CMS/HCC)  Comments:  asymp  Stage 3a chronic kidney disease (CMS/HCC)  Anemia, unspecified type  -     CBC and Auto Differential; Future  -     Iron and TIBC; Future  -     Folate; Future  -     Vitamin B12; Future  Aortic valve stenosis, moderate  Benign essential hypertension  Atrial septal aneurysm  Hypokalemia  -     Basic metabolic panel; Future  Hypomagnesemia  -     Magnesium; Future  Iron deficiency  -     Ferritin; Future  Mild persistent asthma without complication  Decreased range of right hip movement      Assessment/Plan   Problem List Items Addressed This Visit       Aortic valve stenosis, moderate    Asthma    Atrial fibrillation status post cardioversion (CMS/HCC)    Atrial septal aneurysm    Benign essential hypertension    Hypokalemia    Relevant Orders    Basic metabolic panel (Completed)    Hypomagnesemia    Relevant Orders    Magnesium    Iron deficiency    Relevant Orders    Ferritin (Completed)    Splenic artery aneurysm (CMS/HCC)     Seen on plain film 2012 w/ other moderate abd vasc calcification.Dr [facility] eval may 2012:CT 1.1 cm splenic art aneurysm.He rec rpt ct 2 years.She declined further testing.   Re discussed 2022: at feb 10,2022 visit: \"communicated with rad ops: usually no f/u for under 2.5cm splenic art aneurysm (hers 1.1 cm in 2012 at [facility] d/w pt and then with pt and son. hers did have calcium (which is " "better). she is over 85, no bruit, not symptomatic, u/s probably would not see it. would need cta, risk to kidneys-d/w her could do or not do and i did not think we needed to f/u on it but we could. .she agreed. answered sons questions. keep bp under control. (ckd3a)\"            Stage 3a chronic kidney disease (CMS/HCC)     Other Visit Diagnoses       URI with cough and congestion    -  Primary    Relevant Medications    azithromycin (Zithromax) 250 mg tablet    Anemia, unspecified type        Relevant Orders    CBC and Auto Differential (Completed)    Iron and TIBC (Completed)    Folate (Completed)    Vitamin B12 (Completed)    Decreased range of right hip movement                  See wrap up section for patient instructions and  additional treatment plan.     Blood test today to check on anemia, potassium magnesium and kidneys.  I note that you are not tolerating oral iron.   If iron is low and anemia is worse we may need to consider iron infusions. Please eat iron rich foods. We gave you a list.    Mucinex for congestion twice per day.  You can take claritin 10 mg once per day for drainage until ou are better.    There is no evidence to suggest you need an antibiotic today. If you are not improving by this weekend , or if you start to notice increased sinus pressure or a fever or worsening cough, please take the antibiotic azithromycin. You have a paper prescription for this you can fill at the pharmacy..     We noted that you may have arthritis in the right hip, but you preferred not investigating this at this time.    Stay hydrated.  Follow up 3 months, Reception please set up appts for the year, one should be her wellness visit.    Addendum 2-29: added cbc 3 months, added spep and immuno to current labs for anemia eval. This tet was normal in 2018 but this is for a new eval.  "

## 2024-02-27 ENCOUNTER — LAB (OUTPATIENT)
Dept: LAB | Facility: LAB | Age: 89
End: 2024-02-27
Payer: MEDICARE

## 2024-02-27 ENCOUNTER — OFFICE VISIT (OUTPATIENT)
Dept: PRIMARY CARE | Facility: CLINIC | Age: 89
End: 2024-02-27
Payer: MEDICARE

## 2024-02-27 VITALS
BODY MASS INDEX: 31.45 KG/M2 | TEMPERATURE: 97.6 F | WEIGHT: 156 LBS | HEIGHT: 59 IN | DIASTOLIC BLOOD PRESSURE: 67 MMHG | SYSTOLIC BLOOD PRESSURE: 132 MMHG | HEART RATE: 70 BPM

## 2024-02-27 DIAGNOSIS — I48.91 ATRIAL FIBRILLATION STATUS POST CARDIOVERSION (MULTI): ICD-10-CM

## 2024-02-27 DIAGNOSIS — E61.1 IRON DEFICIENCY: ICD-10-CM

## 2024-02-27 DIAGNOSIS — I10 BENIGN ESSENTIAL HYPERTENSION: ICD-10-CM

## 2024-02-27 DIAGNOSIS — J45.30 MILD PERSISTENT ASTHMA WITHOUT COMPLICATION (HHS-HCC): ICD-10-CM

## 2024-02-27 DIAGNOSIS — D64.9 ANEMIA, UNSPECIFIED TYPE: ICD-10-CM

## 2024-02-27 DIAGNOSIS — M25.651 DECREASED RANGE OF RIGHT HIP MOVEMENT: ICD-10-CM

## 2024-02-27 DIAGNOSIS — I72.8 SPLENIC ARTERY ANEURYSM (CMS-HCC): ICD-10-CM

## 2024-02-27 DIAGNOSIS — J06.9 URI WITH COUGH AND CONGESTION: Primary | ICD-10-CM

## 2024-02-27 DIAGNOSIS — I25.3 ATRIAL SEPTAL ANEURYSM: ICD-10-CM

## 2024-02-27 DIAGNOSIS — E83.42 HYPOMAGNESEMIA: ICD-10-CM

## 2024-02-27 DIAGNOSIS — I35.0 AORTIC VALVE STENOSIS, MODERATE: ICD-10-CM

## 2024-02-27 DIAGNOSIS — N18.31 STAGE 3A CHRONIC KIDNEY DISEASE (MULTI): ICD-10-CM

## 2024-02-27 DIAGNOSIS — E87.6 HYPOKALEMIA: ICD-10-CM

## 2024-02-27 PROBLEM — H61.21 IMPACTED CERUMEN OF RIGHT EAR: Status: RESOLVED | Noted: 2023-04-10 | Resolved: 2024-02-27

## 2024-02-27 LAB
ANION GAP SERPL CALC-SCNC: 12 MMOL/L (ref 10–20)
BASOPHILS # BLD AUTO: 0.01 X10*3/UL (ref 0–0.1)
BASOPHILS NFR BLD AUTO: 0.2 %
BUN SERPL-MCNC: 25 MG/DL (ref 6–23)
CALCIUM SERPL-MCNC: 9.5 MG/DL (ref 8.6–10.6)
CHLORIDE SERPL-SCNC: 104 MMOL/L (ref 98–107)
CO2 SERPL-SCNC: 34 MMOL/L (ref 21–32)
CREAT SERPL-MCNC: 0.99 MG/DL (ref 0.5–1.05)
EGFRCR SERPLBLD CKD-EPI 2021: 55 ML/MIN/1.73M*2
EOSINOPHIL # BLD AUTO: 0.07 X10*3/UL (ref 0–0.4)
EOSINOPHIL NFR BLD AUTO: 1.5 %
ERYTHROCYTE [DISTWIDTH] IN BLOOD BY AUTOMATED COUNT: 13.6 % (ref 11.5–14.5)
FERRITIN SERPL-MCNC: 126 NG/ML (ref 8–150)
FOLATE SERPL-MCNC: 13.1 NG/ML
GLUCOSE SERPL-MCNC: 90 MG/DL (ref 74–99)
HCT VFR BLD AUTO: 36 % (ref 36–46)
HGB BLD-MCNC: 11.1 G/DL (ref 12–16)
IMM GRANULOCYTES # BLD AUTO: 0.01 X10*3/UL (ref 0–0.5)
IMM GRANULOCYTES NFR BLD AUTO: 0.2 % (ref 0–0.9)
IRON SATN MFR SERPL: 14 % (ref 25–45)
IRON SERPL-MCNC: 46 UG/DL (ref 35–150)
LYMPHOCYTES # BLD AUTO: 1.37 X10*3/UL (ref 0.8–3)
LYMPHOCYTES NFR BLD AUTO: 29.8 %
MCH RBC QN AUTO: 26.7 PG (ref 26–34)
MCHC RBC AUTO-ENTMCNC: 30.8 G/DL (ref 32–36)
MCV RBC AUTO: 87 FL (ref 80–100)
MONOCYTES # BLD AUTO: 0.64 X10*3/UL (ref 0.05–0.8)
MONOCYTES NFR BLD AUTO: 13.9 %
NEUTROPHILS # BLD AUTO: 2.5 X10*3/UL (ref 1.6–5.5)
NEUTROPHILS NFR BLD AUTO: 54.4 %
NRBC BLD-RTO: 0 /100 WBCS (ref 0–0)
PLATELET # BLD AUTO: 165 X10*3/UL (ref 150–450)
POTASSIUM SERPL-SCNC: 4.6 MMOL/L (ref 3.5–5.3)
RBC # BLD AUTO: 4.16 X10*6/UL (ref 4–5.2)
SODIUM SERPL-SCNC: 145 MMOL/L (ref 136–145)
TIBC SERPL-MCNC: 328 UG/DL (ref 240–445)
UIBC SERPL-MCNC: 282 UG/DL (ref 110–370)
VIT B12 SERPL-MCNC: 539 PG/ML (ref 211–911)
WBC # BLD AUTO: 4.6 X10*3/UL (ref 4.4–11.3)

## 2024-02-27 PROCEDURE — 82607 VITAMIN B-12: CPT

## 2024-02-27 PROCEDURE — 99215 OFFICE O/P EST HI 40 MIN: CPT | Performed by: INTERNAL MEDICINE

## 2024-02-27 PROCEDURE — 36415 COLL VENOUS BLD VENIPUNCTURE: CPT

## 2024-02-27 PROCEDURE — 1126F AMNT PAIN NOTED NONE PRSNT: CPT | Performed by: INTERNAL MEDICINE

## 2024-02-27 PROCEDURE — G2211 COMPLEX E/M VISIT ADD ON: HCPCS | Performed by: INTERNAL MEDICINE

## 2024-02-27 PROCEDURE — 86320 SERUM IMMUNOELECTROPHORESIS: CPT | Performed by: INTERNAL MEDICINE

## 2024-02-27 PROCEDURE — 84165 PROTEIN E-PHORESIS SERUM: CPT | Performed by: INTERNAL MEDICINE

## 2024-02-27 PROCEDURE — 85025 COMPLETE CBC W/AUTO DIFF WBC: CPT

## 2024-02-27 PROCEDURE — 1159F MED LIST DOCD IN RCRD: CPT | Performed by: INTERNAL MEDICINE

## 2024-02-27 PROCEDURE — 86334 IMMUNOFIX E-PHORESIS SERUM: CPT

## 2024-02-27 PROCEDURE — 3078F DIAST BP <80 MM HG: CPT | Performed by: INTERNAL MEDICINE

## 2024-02-27 PROCEDURE — 80048 BASIC METABOLIC PNL TOTAL CA: CPT

## 2024-02-27 PROCEDURE — 82728 ASSAY OF FERRITIN: CPT

## 2024-02-27 PROCEDURE — 1160F RVW MEDS BY RX/DR IN RCRD: CPT | Performed by: INTERNAL MEDICINE

## 2024-02-27 PROCEDURE — 83540 ASSAY OF IRON: CPT

## 2024-02-27 PROCEDURE — 1157F ADVNC CARE PLAN IN RCRD: CPT | Performed by: INTERNAL MEDICINE

## 2024-02-27 PROCEDURE — 83550 IRON BINDING TEST: CPT

## 2024-02-27 PROCEDURE — 84165 PROTEIN E-PHORESIS SERUM: CPT

## 2024-02-27 PROCEDURE — 1036F TOBACCO NON-USER: CPT | Performed by: INTERNAL MEDICINE

## 2024-02-27 PROCEDURE — 82746 ASSAY OF FOLIC ACID SERUM: CPT

## 2024-02-27 PROCEDURE — 3075F SYST BP GE 130 - 139MM HG: CPT | Performed by: INTERNAL MEDICINE

## 2024-02-27 RX ORDER — AZITHROMYCIN 250 MG/1
TABLET, FILM COATED ORAL
Qty: 6 TABLET | Refills: 0 | Status: SHIPPED | OUTPATIENT
Start: 2024-02-27 | End: 2024-03-03

## 2024-02-27 ASSESSMENT — PATIENT HEALTH QUESTIONNAIRE - PHQ9
1. LITTLE INTEREST OR PLEASURE IN DOING THINGS: NOT AT ALL
SUM OF ALL RESPONSES TO PHQ9 QUESTIONS 1 AND 2: 0
2. FEELING DOWN, DEPRESSED OR HOPELESS: NOT AT ALL

## 2024-02-27 ASSESSMENT — PAIN SCALES - GENERAL: PAINLEVEL: 0-NO PAIN

## 2024-02-27 NOTE — PATIENT INSTRUCTIONS
Blood test today to check on anemia, potassium magnesium and kidneys.  I note that you are not tolerating oral iron.   If iron is low and anemia is worse we may need to consider iron infusions. Please eat iron rich foods. We gave you a list.    Mucinex for congestion twice per day.  You can take claritin 10 mg once per day for drainage until ou are better.    There is no evidence to suggest you need an antibiotic today. If you are not improving by this weekend , or if you start to notice increased sinus pressure or a fever or worsening cough, please take the antibiotic azithromycin. You have a paper prescription for this you can fill at the pharmacy..     We noted that you may have arthritis in the right hip, but you preferred not investigating this at this time.    Stay hydrated.  Follow up 3 months, Reception please set up appts for the year, one should be her wellness visit.

## 2024-02-29 DIAGNOSIS — D64.9 ANEMIA, UNSPECIFIED TYPE: Primary | ICD-10-CM

## 2024-02-29 LAB — PROT SERPL-MCNC: 6.3 G/DL (ref 6.4–8.2)

## 2024-03-01 NOTE — ASSESSMENT & PLAN NOTE
"Seen on plain film 2012 w/ other moderate abd vasc calcification.Dr [facility] surgery [facility] eval may 2012:CT 1.1 cm splenic art aneurysm.He rec rpt ct 2 years.She declined further testing.   Re discussed 2022: at feb 10,2022 visit: \"communicated with rad ops: usually no f/u for under 2.5cm splenic art aneurysm (hers 1.1 cm in 2012 at [facility] [place] [facility] d/w pt and then with pt and son. hers did have calcium (which is better). she is over 85, no bruit, not symptomatic, u/s probably would not see it. would need cta, risk to kidneys-d/w her could do or not do and i did not think we needed to f/u on it but we could. .she agreed. answered sons questions. keep bp under control. (ckd3a)\"     "

## 2024-03-01 NOTE — RESULT ENCOUNTER NOTE
Please call the patient regarding her result.  Kidney disease is mild and stable.  Potassium is good    Anemia is the same as it was 11 months ago. Continue iron rich diet. B12 and folate are ok. Since stool tests (fit and occult blood) were negative in July, and she has no abdominal complaints or bleeding complaints,  and anemia is unchanged  will not do any further stool tests. Will recheck anemia blood test in 3 months.

## 2024-03-06 LAB
ALBUMIN: 3.9 G/DL (ref 3.4–5)
ALPHA 1 GLOBULIN: 0.3 G/DL (ref 0.2–0.6)
ALPHA 2 GLOBULIN: 0.8 G/DL (ref 0.4–1.1)
BETA GLOBULIN: 0.7 G/DL (ref 0.5–1.2)
GAMMA GLOBULIN: 0.5 G/DL (ref 0.5–1.4)
IMMUNOFIXATION COMMENT: NORMAL
PATH REVIEW - SERUM IMMUNOFIXATION: NORMAL
PATH REVIEW-SERUM PROTEIN ELECTROPHORESIS: NORMAL
PROTEIN ELECTROPHORESIS COMMENT: NORMAL

## 2024-06-06 NOTE — PROGRESS NOTES
"  Patient ID: Teresita Molina is a 89 y.o. female who presents for Follow-up (Pt here for follow up and review. Pt denies any new problems or concerns at this time).  LAST VISIT PLAN FROM: 2/27/2024  Diagnoses and all orders for this visit:  URI with cough and congestion (Primary)  -     azithromycin (Zithromax) 250 mg tablet; Take 2 tablets (500 mg) by mouth once daily for 1 day, THEN 1 tablet (250 mg) once daily for 4 days. Take 2 tabs (500 mg) by mouth today, than 1 daily for 4 days..  Atrial fibrillation status post cardioversion (CMS/HCC)  Splenic artery aneurysm (CMS/HCC)  Comments:  asymp  Stage 3a chronic kidney disease (CMS/HCC)  Anemia, unspecified type  -     CBC and Auto Differential; Future  -     Iron and TIBC; Future  -     Folate; Future  -     Vitamin B12; Future  Aortic valve stenosis, moderate  Benign essential hypertension  Atrial septal aneurysm  Hypokalemia  -     Basic metabolic panel; Future  Hypomagnesemia  -     Magnesium; Future  Iron deficiency  -     Ferritin; Future  Mild persistent asthma without complication  Decreased range of right hip movement      Assessment/Plan   Problem List Items Addressed This Visit       Aortic valve stenosis, moderate    Asthma    Atrial fibrillation status post cardioversion (CMS/HCC)    Atrial septal aneurysm    Benign essential hypertension    Hypokalemia    Relevant Orders    Basic metabolic panel (Completed)    Hypomagnesemia    Relevant Orders    Magnesium    Iron deficiency    Relevant Orders    Ferritin (Completed)    Splenic artery aneurysm (CMS/HCC)     Seen on plain film 2012 w/ other moderate abd vasc calcification.Dr [facility] eval may 2012:CT 1.1 cm splenic art aneurysm.He rec rpt ct 2 years.She declined further testing.   Re discussed 2022: at feb 10,2022 visit: \"communicated with rad ops: usually no f/u for under 2.5cm splenic art aneurysm (hers 1.1 cm in 2012 at [facility] [place] [facility]). d/w pt and then with pt and son. hers did have " "calcium (which is better). she is over 85, no bruit, not symptomatic, u/s probably would not see it. would need cta, risk to kidneys-d/w her could do or not do and i did not think we needed to f/u on it but we could. .she agreed. answered sons questions. keep bp under control. ([id number])\"            Stage 3a chronic kidney disease (CMS/HCC)     Other Visit Diagnoses       URI with cough and congestion    -  Primary    Relevant Medications    azithromycin (Zithromax) 250 mg tablet    Anemia, unspecified type        Relevant Orders    CBC and Auto Differential (Completed)    Iron and TIBC (Completed)    Folate (Completed)    Vitamin B12 (Completed)    Decreased range of right hip movement              See wrap up section for patient instructions and  additional treatment plan.   Blood test today to check on anemia, potassium magnesium and kidneys.  I note that you are not tolerating oral iron.   If iron is low and anemia is worse we may need to consider iron infusions. Please eat iron rich foods. We gave you a list.  Mucinex for congestion twice per day.  You can take claritin 10 mg once per day for drainage until ou are better.  There is no evidence to suggest you need an antibiotic today. If you are not improving by this weekend , or if you start to notice increased sinus pressure or a fever or worsening cough, please take the antibiotic azithromycin. You have a paper prescription for this you can fill at the pharmacy..   We noted that you may have arthritis in the right hip, but you preferred not investigating this at this time.  Stay hydrated.  Follow up 3 months, Reception please set up appts for the year, one should be her wellness visit.  Addendum 2-29: added cbc 3 months, added spep and immuno to current labs for anemia eval. This tet was normal in 2018 but this is for a new eval.  END LAST VISIT PLAN  -------------------------------------------    HPI  Here to follow up on anemia, htn  afib on anticoag eliquis, " "hx asthma, depression, gerd.  Had a  uri with cough and congestion that was resolving at the last visit.  Anemia is stableper labs,   Note: last colonoscopy 2014, declined by pt since then despite h colon ca sister.  Hx squamous cell ca face removed/saw rene shipman, no visits I can see, asked her to make a follow up    Scribe:  Patient is an 89-year-old female who presents today for follow up on anemia, hypertension, A-Fib, asthma, depression, and GERD.and rhinitis    Chronic rhinitis: she notes she chronically has a runny nose clear (it did not occur during this visit). No discolored drainage or fever.  Patient admits on inquiry to some pressure and  frontal pain in her forehead with rhinitis -this is chronic.  She also reports itchy eyes.  She continues on Astelin for chronic rhinitis.it does help.  We did a CT of her sinuses in 2017for these same symptoms and her sinuses where clear.      Asthma:  She will use her inhaler when congested but states she does not have to use it often.     Diarrhea:  She reports intermittent loose stools or diarrhea. It is somewhat chronic but maybe a little more often this year and depends on what she eats-she does not ave a dietary trigger. We discussed it is possible that this could be related to  her sertraline medication. Sh eadmits she is depressed \"off and on\" . Mainly she misses both of her late husbands and likes to be left alone but her family is always wanting to do things with her.  We will switch her from sertraline to fluoxetine 10 mg.  I explained the weaning process from sertraline to starting the new medication.  We also discussed that she may feel a little different for a few weeks but this will pass.  Advised not to stop the medication. Advised to give the office a call to let us know how she is doing with the change in a few weeks.  I suspect this will improve her loose stools.  She denies bloodied stool or black stools.  Patient has a family history of " "colon cancer but does not wish to have a repeat colonoscopy.  Her last colonoscopy was 10 years ago.  We did do a stool fit test last year.She declined doing one this year and does not want to do any colon screening.  I recommended following up with GI ifdiarrhea does not resolve with medication change..    She reports one bowel movement today so far,it was loose, but says if she was home she would have already gone more often than once..  Appetite stable.  She states she eats very little meat.    No gerd complaints.    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 She also had skin lesions removed from left forearm but does not know what that resulted.  She saw Kasia Yuen CNP, on 03/05 2019 and a biopsy was taken of the left cheek which showed squamous cell carcinoma.  She has not followed up with Dermatology since that time.  We discussed the importance of following up with Dermatology to monitor for recurrence. She does have some rough areas on the lateral left nose area and crusty white raised irregular lesion left forearm dorsum.    Joint pain:  She reports experiencing joint pain in her hands.and that her finger joints are getting bigger. These are the pip joints. The pain is not severe. No swelling but sometimes harder to close her hands from the finger joints.    Depression:  During the day she will cook, do laundry and work around the house.  She reports that, at times, she will get depressed, at times, missing her  or not being able to do the things that she used too.  When asked about adjusting her medication or increasing it, she mentions \"you are the doctor\"-not a refusal. This was prior to the discussion about switching sertraline to fluoxetine, will see how she does on the fluoxetine.    Anemia:  She is not taking her iron supplementation because it made her sick.  She was supposed to get repeat blood work done prior to this visit, but she had " not completed yet.  She states, she did not remember.  She will go for blood work today. Denies abd pain hb, blood in stool melena. She is on an anticoagulant . Her mild anemia is chronic. She is on a ppi that can decrease absorption of dietary iron. She takes b12 to supplement as well and levels are ok.She has had hemoglobins between 10.5 and 12.1 x several years, anemia is chronic.  Has not had egd but is on ppi and no gi c/o and anemia not worse so doubt any ulcers    Afib-no palpitations. Is on anticoag-no blood in urine or stool  Htn-bp well controlled. See ros.  Edema: leg swelling has been better.     Scribe Attestation  By signing my name below, I, Maya Perez MD, Scribe attest that this documentation has been prepared under the direction and in the presence of Maya Perez MD. All medical record entries made by the Scribe were at my direction or personally dictated by me. I have reviewed the chart and agree that the record accurately reflects my personal performance of the history, physical exam, discussion and plan.     Review of Systems  See ROS in HPI  Cardiac: No CP , palpitations, increased fournier-she gets soboe at the top of stairs and this is not new.  Resp: No sob, wheezing, cough  Extremities: No leg or ankle swelling, no claudication  Neuro: No dizziness, syncope, blurred vision. No new headaches.    Current Outpatient Medications   Medication Instructions    apixaban (ELIQUIS) 2.5 mg, oral, Every 12 hours    azelastine (Astelin) 137 mcg (0.1 %) nasal spray 2 sprays, Each Nostril, 2 times daily PRN    chlorthalidone (HYGROTON) 25 mg, oral, Daily    cholecalciferol (VITAMIN D-3) 50 mcg, oral, Daily    cyanocobalamin, vitamin B-12, 1,000 mcg tablet, sublingual 1 tablet, sublingual, Daily    FLUoxetine (PROzac) 10 mg capsule Take one cap daily for one week then increase to 2 caps once per day.    furosemide (Lasix) 40 mg tablet Keep on med list in case needed for prn use for leg swelling in the  future. Currently not needing oct 2023.    irbesartan (AVAPRO) 75 mg, oral, Daily    magnesium glycinate-mag oxide 120 mg magnesium capsule 1 tablet, oral, Daily    pantoprazole (PROTONIX) 40 mg, oral, Daily before breakfast    sertraline (Zoloft) 50 mg tablet Decrease to one tab daily for one week, then stop     Allergies   Allergen Reactions    Amoxicillin Shortness of breath and Swelling     Reaction Date:Unknown    Codeine Shortness of breath    Penicillins Shortness of breath     Reaction Date:Unknown    Montelukast Cough    Morphine Nausea Only     Reaction Date:Approx [id number]    Opioids - Morphine Analogues Swelling    Spironolactone Nausea Only    Tramadol Nausea Only     Objective  Results  reviewed:  Lab Results   Component Value Date    WBC 4.6 02/27/2024    HGB 11.1 (L) 02/27/2024    HCT 36.0 02/27/2024     02/27/2024    CHOL 152 08/26/2022    TRIG 114 08/26/2022    HDL 30.1 (A) 08/26/2022    ALT 7 07/06/2023    AST 12 07/06/2023     02/27/2024    K 4.6 02/27/2024     02/27/2024    CREATININE 0.99 02/27/2024    BUN 25 (H) 02/27/2024    CO2 34 (H) 02/27/2024    TSH 2.18 08/26/2022    HGBA1C 5.8 (A) 04/11/2023    Result Notes            Component  Ref Range & Units 3 mo ago  (2/27/24) 7 mo ago  (10/18/23) 10 mo ago  (8/1/23) 11 mo ago  (7/6/23) 1 yr ago  (5/3/23) 1 yr ago  (4/11/23) 1 yr ago  (10/17/22)   WBC  4.4 - 11.3 x10*3/uL 4.6 4.6 4.1 Low  R 4.3 Low  R 4.4 R 4.2 Low  R 4.3 Low  R   nRBC  0.0 - 0.0 /100 WBCs 0.0 0.0 0.0 R 0.0 R 0.0 R 0.0 R 0.0 R   RBC  4.00 - 5.20 x10*6/uL 4.16 4.02 3.96 Low  R 3.71 Low  R 4.13 R 4.15 R 4.20 R   Hemoglobin  12.0 - 16.0 g/dL 11.1 Low  10.9 Low  10.7 Low  10.1 Low  10.9 Low  11.1 Low  12.1   Hematocrit  36.0 - 46.0 % 36.0 35.1 Low  34.0 Low  31.6 Low  35.4 Low  36.1 36.6   MCV  80 - 100 fL 87 87 86 85 86 87 87   MCH  26.0 - 34.0 pg 26.7 27.1        MCHC  32.0 - 36.0 g/dL 30.8 Low  31.1 Low  31.5 Low  32.0 30.8 Low  30.7 Low  33.1   RDW  11.5 - 14.5  "% 13.6 14.7 High  14.5 13.7 14.5 14.4 14.2   Platelets  150 - 450 x10*3/uL 165 154 154 R 159 R 168 R 153 R 167 R      She has had hemoglobins between 10.5 and 12.1 x several years, anemia is chronic.  Has not had egd but is on ppi and no gi c/o and anemia not worse so doubt any ulcers  Colonoscopy was declined by pt -last done 2014 despite [facility] colon ca. Past hx diminutive polyp.     Physical Exam  HENT:      Head:        Comments: Scaly areas left face/nose  Skin:            Comments: General area of white cursty raised nevus left forearm dorsum.     /52 (BP Location: Right arm, Patient Position: Sitting, BP Cuff Size: Adult)   Pulse 60   Resp 18   Ht 1.499 m (4' 11\")   Wt 68.9 kg (152 lb)   BMI 30.70 kg/m²       4/11/2023     8:35 AM 7/5/2023     4:57 PM 7/12/2023    11:14 AM 8/1/2023     8:36 AM 10/18/2023     2:25 PM 2/27/2024     8:25 AM 6/11/2024     9:37 AM   Vitals   Systolic 116 116 124 124 120 132 110   Diastolic 68 54 62 62 66 67 52   Heart Rate 64 64 60 64 64 70 60   Temp      36.4 °C (97.6 °F)    Resp 18 18 16 16 18  18   Height (in) 1.549 m (5' 1\") 1.473 m (4' 10\") 1.473 m (4' 10\") 1.499 m (4' 11\") 1.499 m (4' 11\") 1.499 m (4' 11\") 1.499 m (4' 11\")   Weight (lb) 155 158 153 157 158 156 152   BMI 29.29 kg/m2 33.02 kg/m2 31.98 kg/m2 31.71 kg/m2 31.91 kg/m2 31.51 kg/m2 30.7 kg/m2   BSA (m2) 1.74 m2 1.71 m2 1.69 m2 1.72 m2 1.73 m2 1.72 m2 1.69 m2   Visit Report Report Report Report Report Report Report Report       Patient is an elderly  female who looks her usual self and is in no obvious distress.  HEENT:   Normocephalic, no facial asymmetry  Eyes: Sclera and conjunctiva are clear.  She sounds mildly nasally congested. She did not cough or have any rhinorrhea during her visit today.  Eomi.  Neck: No adenopathy cervical (Ant/post/lat), no Supraclavicular nodes.   Thyroid normal.  Carotid pulses normal, no carotid bruits but has radiated murmur..  Lungs : RR normal. Clear to auscultation " anterior, posterior and lateral. No rales, wheezes rhonchi or rubs. Good air exchange.  Heart: RRR. No gallop, click or rub.  She has a 2-3/6 systolic murmur heard over the URSB and ULSB which radiates faintly to the carotid arteries and you can hear if faintly to the apex.stable  Abdomen: Bowel sounds normal, No bruits. No pulsatile mass. No hepatosplenomegaly, masses or tenderness. Soft, no guarding.  Vascular:  Posterior tibialis  pulses within normal limits bilaterally.   Extremities: no upper extremity edema. No lower extremity pitting edema. -better.  Musculoskeletal: no synovitis of joints seen. Has enlargement of pip joints both hands but no swelling.  Negative tinels phalens.  Neuro: CN 2-12 intact. Alert, appropriate.  Ambulates independently.  No gross motor deficit.   No tremors.  Psych: normal mood and affect.  Skin: No rash, bruising petechiae or jaundice.    Teresita was seen today for follow-up.  Diagnoses and all orders for this visit:  Benign essential hypertension (Primary)  -     Comprehensive metabolic panel; Future  -     Magnesium; Future  Anemia in chronic illness  -     CBC and Auto Differential; Future  -     Iron and TIBC; Future  Squamous cell carcinoma, face  Comments:  ciara shipman, 2019, invasive, removed.  Orders:  -     Cancel: Referral to Dermatology  -     Referral to Dermatology  Mild episode of recurrent major depressive disorder (CMS-HCC)  -     FLUoxetine (PROzac) 10 mg capsule; Take one cap daily for one week then increase to 2 caps once per day.  Chronic diarrhea  Mild intermittent asthma without complication (HHS-HCC)  Arthritis of both hands  -     Sedimentation Rate; Future  -     C-reactive protein; Future  Family history of colon cancer  Anxiety disorder, unspecified type  Comments:  stable refill sertraline  Orders:  -     sertraline (Zoloft) 50 mg tablet; Decrease to one tab daily for one week, then stop  Medication course changed  Colonoscopy refused  Colon cancer  screening declined     Problem List Items Addressed This Visit       Anxiety disorder    Relevant Medications    sertraline (Zoloft) 50 mg tablet    Arthritis of both hands    Relevant Orders    Sedimentation Rate    C-reactive protein    Asthma (HHS-HCC)    Benign essential hypertension - Primary    Relevant Orders    Comprehensive metabolic panel    Magnesium    Neoplasm of skin of face     Other Visit Diagnoses       Anemia in chronic illness        Relevant Orders    CBC and Auto Differential    Iron and TIBC    Mild episode of recurrent major depressive disorder (CMS-HCC)        Relevant Medications    FLUoxetine (PROzac) 10 mg capsule    Chronic diarrhea        Family history of colon cancer        Medication course changed        Colonoscopy refused        Colon cancer screening declined              I am the primary care physician for this patient's ongoing medical care, which is managed during and in between office visits.    See patient instructions in wrap up plan, orders and comments for treatment plan.  Patient Instructions:  Blood test today.    Medication change: stop sertraline and change to fluoxetine. This should help with the loose stools you are having and also help with mood.    For one week: take only one tab of sertraline 50mg and take one capsule of the new medication, fluoxetine 10 mg.  The second week stop the sertraline and change the fluoxetine to 2 capsules once per day (20 mg).    Check in with us in a few weeks on how things are going with the fluoxetine.  Keep your September appointment here.    Please schedule a follow up with Dermatology, such as ELISEO Fuentes whom you have seen previously.  With past history of squamous cell ca of the face, you should be seen at least annually for a skin check.  Also they need to look at the rough spots on your nose and left side of the face, and the areas on your left hand.    We discussed colonoscopy and your family history of colon cancer-and  that you preferred no further colonoscopies. Your last was 10 years ago. You also preferred not doing any stool testing (you did one last August and it was normal).

## 2024-06-11 ENCOUNTER — LAB (OUTPATIENT)
Dept: LAB | Facility: LAB | Age: 89
End: 2024-06-11
Payer: MEDICARE

## 2024-06-11 ENCOUNTER — OFFICE VISIT (OUTPATIENT)
Dept: PRIMARY CARE | Facility: CLINIC | Age: 89
End: 2024-06-11
Payer: MEDICARE

## 2024-06-11 VITALS
WEIGHT: 152 LBS | BODY MASS INDEX: 30.64 KG/M2 | DIASTOLIC BLOOD PRESSURE: 52 MMHG | SYSTOLIC BLOOD PRESSURE: 110 MMHG | HEART RATE: 60 BPM | HEIGHT: 59 IN | RESPIRATION RATE: 18 BRPM

## 2024-06-11 DIAGNOSIS — Z80.0 FAMILY HISTORY OF COLON CANCER: ICD-10-CM

## 2024-06-11 DIAGNOSIS — M19.041 ARTHRITIS OF BOTH HANDS: ICD-10-CM

## 2024-06-11 DIAGNOSIS — D49.2 NEOPLASM OF SKIN OF FOREARM: ICD-10-CM

## 2024-06-11 DIAGNOSIS — F41.9 ANXIETY DISORDER, UNSPECIFIED TYPE: Chronic | ICD-10-CM

## 2024-06-11 DIAGNOSIS — K52.9 CHRONIC DIARRHEA: ICD-10-CM

## 2024-06-11 DIAGNOSIS — D63.8 ANEMIA IN CHRONIC ILLNESS: ICD-10-CM

## 2024-06-11 DIAGNOSIS — D64.9 ANEMIA, UNSPECIFIED TYPE: ICD-10-CM

## 2024-06-11 DIAGNOSIS — Z53.20 COLONOSCOPY REFUSED: ICD-10-CM

## 2024-06-11 DIAGNOSIS — M19.042 ARTHRITIS OF BOTH HANDS: ICD-10-CM

## 2024-06-11 DIAGNOSIS — Z53.20 COLON CANCER SCREENING DECLINED: ICD-10-CM

## 2024-06-11 DIAGNOSIS — I48.91 ATRIAL FIBRILLATION STATUS POST CARDIOVERSION (MULTI): ICD-10-CM

## 2024-06-11 DIAGNOSIS — D49.2 NEOPLASM OF SKIN OF FACE: ICD-10-CM

## 2024-06-11 DIAGNOSIS — C44.320 SQUAMOUS CELL CARCINOMA, FACE: ICD-10-CM

## 2024-06-11 DIAGNOSIS — J45.20 MILD INTERMITTENT ASTHMA WITHOUT COMPLICATION (HHS-HCC): ICD-10-CM

## 2024-06-11 DIAGNOSIS — F33.0 MILD EPISODE OF RECURRENT MAJOR DEPRESSIVE DISORDER (CMS-HCC): ICD-10-CM

## 2024-06-11 DIAGNOSIS — R01.1 SYSTOLIC MURMUR: ICD-10-CM

## 2024-06-11 DIAGNOSIS — I10 BENIGN ESSENTIAL HYPERTENSION: ICD-10-CM

## 2024-06-11 DIAGNOSIS — I10 BENIGN ESSENTIAL HYPERTENSION: Primary | ICD-10-CM

## 2024-06-11 DIAGNOSIS — Z79.899 MEDICATION COURSE CHANGED: ICD-10-CM

## 2024-06-11 DIAGNOSIS — Z79.01 ANTICOAGULANT LONG-TERM USE: ICD-10-CM

## 2024-06-11 PROCEDURE — 36415 COLL VENOUS BLD VENIPUNCTURE: CPT

## 2024-06-11 PROCEDURE — 83550 IRON BINDING TEST: CPT

## 2024-06-11 PROCEDURE — 1123F ACP DISCUSS/DSCN MKR DOCD: CPT | Performed by: INTERNAL MEDICINE

## 2024-06-11 PROCEDURE — 80053 COMPREHEN METABOLIC PANEL: CPT

## 2024-06-11 PROCEDURE — 1157F ADVNC CARE PLAN IN RCRD: CPT | Performed by: INTERNAL MEDICINE

## 2024-06-11 PROCEDURE — G2212 PROLONG OUTPT/OFFICE VIS: HCPCS | Performed by: INTERNAL MEDICINE

## 2024-06-11 PROCEDURE — 1159F MED LIST DOCD IN RCRD: CPT | Performed by: INTERNAL MEDICINE

## 2024-06-11 PROCEDURE — 86140 C-REACTIVE PROTEIN: CPT

## 2024-06-11 PROCEDURE — 1158F ADVNC CARE PLAN TLK DOCD: CPT | Performed by: INTERNAL MEDICINE

## 2024-06-11 PROCEDURE — 3074F SYST BP LT 130 MM HG: CPT | Performed by: INTERNAL MEDICINE

## 2024-06-11 PROCEDURE — 1125F AMNT PAIN NOTED PAIN PRSNT: CPT | Performed by: INTERNAL MEDICINE

## 2024-06-11 PROCEDURE — 1036F TOBACCO NON-USER: CPT | Performed by: INTERNAL MEDICINE

## 2024-06-11 PROCEDURE — 3078F DIAST BP <80 MM HG: CPT | Performed by: INTERNAL MEDICINE

## 2024-06-11 PROCEDURE — 1160F RVW MEDS BY RX/DR IN RCRD: CPT | Performed by: INTERNAL MEDICINE

## 2024-06-11 PROCEDURE — 85025 COMPLETE CBC W/AUTO DIFF WBC: CPT

## 2024-06-11 PROCEDURE — 85652 RBC SED RATE AUTOMATED: CPT

## 2024-06-11 PROCEDURE — 99215 OFFICE O/P EST HI 40 MIN: CPT | Performed by: INTERNAL MEDICINE

## 2024-06-11 PROCEDURE — G2211 COMPLEX E/M VISIT ADD ON: HCPCS | Performed by: INTERNAL MEDICINE

## 2024-06-11 PROCEDURE — 83540 ASSAY OF IRON: CPT

## 2024-06-11 RX ORDER — SERTRALINE HYDROCHLORIDE 50 MG/1
TABLET, FILM COATED ORAL
Qty: 140 TABLET | Refills: 2 | Status: SHIPPED | OUTPATIENT
Start: 2024-06-11

## 2024-06-11 RX ORDER — FLUOXETINE 10 MG/1
CAPSULE ORAL
Qty: 60 CAPSULE | Refills: 1 | Status: SHIPPED | OUTPATIENT
Start: 2024-06-11

## 2024-06-11 ASSESSMENT — PAIN SCALES - GENERAL: PAINLEVEL: 4

## 2024-06-11 ASSESSMENT — PATIENT HEALTH QUESTIONNAIRE - PHQ9
SUM OF ALL RESPONSES TO PHQ9 QUESTIONS 1 AND 2: 0
1. LITTLE INTEREST OR PLEASURE IN DOING THINGS: NOT AT ALL
2. FEELING DOWN, DEPRESSED OR HOPELESS: NOT AT ALL

## 2024-06-11 NOTE — PATIENT INSTRUCTIONS
Blood test today.    Medication change: stop sertraline and change to fluoxetine. This should help with the loose stools you are having and also help with mood.    For one week: take only one tab of sertraline 50mg and take one capsule of the new medication, fluoxetine 10 mg.  The second week stop the sertraline and change the fluoxetine to 2 capsules once per day (20 mg).    Check in with us in a few weeks on how things are going with the fluoxetine.  Keep your September appointment here.    Please schedule a follow up with Dermatology, such as Kasia Yuen, ELISEO whom you have seen previously.  With past history of squamous cell ca of the face, you should be seen at least annually for a skin check.  Also they need to look at the rough spots on your nose and left side of the face, and the areas on your left hand.    We discussed colonoscopy and your family history of colon cancer-and that you preferred no further colonoscopies. Your last was 10 years ago. You also preferred not doing any stool testing (you did one last August and it was normal).

## 2024-06-12 LAB
ALBUMIN SERPL BCP-MCNC: 4.2 G/DL (ref 3.4–5)
ALP SERPL-CCNC: 76 U/L (ref 33–136)
ALT SERPL W P-5'-P-CCNC: 8 U/L (ref 7–45)
ANION GAP SERPL CALC-SCNC: 15 MMOL/L (ref 10–20)
AST SERPL W P-5'-P-CCNC: 15 U/L (ref 9–39)
BASOPHILS # BLD AUTO: 0.02 X10*3/UL (ref 0–0.1)
BASOPHILS NFR BLD AUTO: 0.5 %
BILIRUB SERPL-MCNC: 0.7 MG/DL (ref 0–1.2)
BUN SERPL-MCNC: 25 MG/DL (ref 6–23)
CALCIUM SERPL-MCNC: 9.3 MG/DL (ref 8.6–10.6)
CHLORIDE SERPL-SCNC: 103 MMOL/L (ref 98–107)
CO2 SERPL-SCNC: 30 MMOL/L (ref 21–32)
CREAT SERPL-MCNC: 0.89 MG/DL (ref 0.5–1.05)
CRP SERPL-MCNC: 0.64 MG/DL
EGFRCR SERPLBLD CKD-EPI 2021: 62 ML/MIN/1.73M*2
EOSINOPHIL # BLD AUTO: 0.08 X10*3/UL (ref 0–0.4)
EOSINOPHIL NFR BLD AUTO: 1.8 %
ERYTHROCYTE [DISTWIDTH] IN BLOOD BY AUTOMATED COUNT: 14.3 % (ref 11.5–14.5)
ERYTHROCYTE [SEDIMENTATION RATE] IN BLOOD BY WESTERGREN METHOD: 7 MM/H (ref 0–30)
GLUCOSE SERPL-MCNC: 92 MG/DL (ref 74–99)
HCT VFR BLD AUTO: 36.2 % (ref 36–46)
HGB BLD-MCNC: 11.3 G/DL (ref 12–16)
IMM GRANULOCYTES # BLD AUTO: 0.02 X10*3/UL (ref 0–0.5)
IMM GRANULOCYTES NFR BLD AUTO: 0.5 % (ref 0–0.9)
IRON SATN MFR SERPL: 7 % (ref 25–45)
IRON SERPL-MCNC: 22 UG/DL (ref 35–150)
LYMPHOCYTES # BLD AUTO: 1.48 X10*3/UL (ref 0.8–3)
LYMPHOCYTES NFR BLD AUTO: 33.9 %
MCH RBC QN AUTO: 27 PG (ref 26–34)
MCHC RBC AUTO-ENTMCNC: 31.2 G/DL (ref 32–36)
MCV RBC AUTO: 87 FL (ref 80–100)
MONOCYTES # BLD AUTO: 0.57 X10*3/UL (ref 0.05–0.8)
MONOCYTES NFR BLD AUTO: 13.1 %
NEUTROPHILS # BLD AUTO: 2.19 X10*3/UL (ref 1.6–5.5)
NEUTROPHILS NFR BLD AUTO: 50.2 %
NRBC BLD-RTO: 0 /100 WBCS (ref 0–0)
PLATELET # BLD AUTO: 157 X10*3/UL (ref 150–450)
POTASSIUM SERPL-SCNC: 4.2 MMOL/L (ref 3.5–5.3)
PROT SERPL-MCNC: 6.2 G/DL (ref 6.4–8.2)
RBC # BLD AUTO: 4.18 X10*6/UL (ref 4–5.2)
SODIUM SERPL-SCNC: 144 MMOL/L (ref 136–145)
TIBC SERPL-MCNC: 327 UG/DL (ref 240–445)
UIBC SERPL-MCNC: 305 UG/DL (ref 110–370)
WBC # BLD AUTO: 4.4 X10*3/UL (ref 4.4–11.3)

## 2024-06-26 ENCOUNTER — TELEPHONE (OUTPATIENT)
Dept: PRIMARY CARE | Facility: CLINIC | Age: 89
End: 2024-06-26
Payer: MEDICARE

## 2024-06-26 NOTE — TELEPHONE ENCOUNTER
Pt called in. She wanted me to tell Dr Perez that she does not want to start the new medicine that she rx because there are too many side effects and she can handle things. She also wanted to tell her that she has not yet made an apt for dermatology, but she will soon.

## 2024-06-27 NOTE — TELEPHONE ENCOUNTER
This would be fluoxetine. She di dnot state if she stopped her sertraline, so will leave both on the med list until she comes in for an appt

## 2024-08-09 DIAGNOSIS — K21.9 GASTROESOPHAGEAL REFLUX DISEASE, UNSPECIFIED WHETHER ESOPHAGITIS PRESENT: ICD-10-CM

## 2024-08-09 DIAGNOSIS — F41.9 ANXIETY DISORDER, UNSPECIFIED TYPE: Chronic | ICD-10-CM

## 2024-08-09 DIAGNOSIS — J30.9 ALLERGIC RHINITIS, UNSPECIFIED SEASONALITY, UNSPECIFIED TRIGGER: ICD-10-CM

## 2024-08-09 DIAGNOSIS — I48.91 ATRIAL FIBRILLATION STATUS POST CARDIOVERSION (MULTI): Chronic | ICD-10-CM

## 2024-08-09 NOTE — TELEPHONE ENCOUNTER
Can wait until return  Please refill    irbesartan (Avapro) 75 mg tablet   75 mg, Daily   chlorthalidone (Hygroton) 25 mg tablet   25 mg, Daily   apixaban (Eliquis) 2.5 mg tablet   2.5 mg, Every 12 hours   pantoprazole (ProtoNix) 40 mg EC tablet   40 mg, Daily before breakfast   [facility] [place]

## 2024-08-13 RX ORDER — SERTRALINE HYDROCHLORIDE 50 MG/1
TABLET, FILM COATED ORAL
Qty: 140 TABLET | Refills: 2 | Status: SHIPPED | OUTPATIENT
Start: 2024-08-13

## 2024-08-13 RX ORDER — IRBESARTAN 75 MG/1
75 TABLET ORAL DAILY
Qty: 90 TABLET | Refills: 2 | Status: SHIPPED | OUTPATIENT
Start: 2024-08-13

## 2024-08-13 RX ORDER — CHLORTHALIDONE 25 MG/1
25 TABLET ORAL DAILY
Qty: 90 TABLET | Refills: 2 | Status: SHIPPED | OUTPATIENT
Start: 2024-08-13

## 2024-08-13 RX ORDER — APIXABAN 2.5 MG/1
TABLET, FILM COATED ORAL
Qty: 180 TABLET | Refills: 2 | Status: SHIPPED | OUTPATIENT
Start: 2024-08-13

## 2024-08-13 RX ORDER — PANTOPRAZOLE SODIUM 40 MG/1
40 TABLET, DELAYED RELEASE ORAL
Qty: 90 TABLET | Refills: 2 | Status: SHIPPED | OUTPATIENT
Start: 2024-08-13

## 2024-08-13 RX ORDER — AZELASTINE 1 MG/ML
SPRAY, METERED NASAL
Qty: 30 ML | Refills: 2 | Status: SHIPPED | OUTPATIENT
Start: 2024-08-13

## 2024-09-06 NOTE — PROGRESS NOTES
Patient ID: Teresita Molina is a 89 y.o. female who presents for Follow-up (Teresita is here for follow up visit and medication review, no new concerns at this time.)  LAST VISIT PLAN FROM:   06/26/2024  This would be fluoxetine. She did not state if she stopped her sertraline, so will leave both on the med list until she comes in for an appt.    06/11/2024  Patient Instructions:  Blood test today.  Medication change: stop sertraline and change to fluoxetine. This should help with the loose stools you are having and also help with mood.  For one week: take only one tab of sertraline 50mg and take one capsule of the new medication, fluoxetine 10 mg.  The second week stop the sertraline and change the fluoxetine to 2 capsules once per day (20 mg).  Check in with us in a few weeks on how things are going with the fluoxetine.  Keep your September appointment here.  Please schedule a follow up with Dermatology, such as ELISEO Fuentes whom you have seen previously.  With past history of squamous cell ca of the face, you should be seen at least annually for a skin check.  Also they need to look at the rough spots on your nose and left side of the face, and the areas on your left hand.  We discussed colonoscopy and your family history of colon cancer-and that you preferred no further colonoscopies. Your last was 10 years ago. You also preferred not doing any stool testing (you did one last August and it was normal).  END LAST VISIT PLAN  -------------------------------------------  HPI  Patient is a 89-year-old female who presents today for follow up on chronic anemia, afib, anticoag use, htn, reactive airways/asthma, anxiety,.. She did not change her sertraline to fluoxetine-was afraid of the s/e she read the insert. We recommended the change as thought sertraline may be a contributing factor to her losse stools. She would rather stay on the sertraline and is doing well on it mood wise. She states her stools are not that  bad.    Anemia:  Patient is not taking iron supplementation as it makes her nauseous.  She only took it 3-4 days.  Anemia was stable at last check.  Last iron check was 22 micrograms/dL.  She has never had iron supplementation in an IV/injection.  Denies black or bloodied stools.  Patient states she will have a bowel movement 3-4 times daily.  Patient states that she will eat chicken but does not like to eat much meat. We discussed trying ferrous fumerate-vitamin C every other day.  Patient will call if it causes nausea, and we will revisit.  Patient states that the capsule form of pills seems to bother her.  Order placed for 1 month supply (15 pills).  We discussed that she will have to call the pharmacy to order the medication.  We discussed the other option of iron infusion in the future if the ferrous fumerate is intolerable.  Order placed for ferrous fumerate-vitamin C 65-25 every other day.    Vitamin D Deficiency:  Patient is taking vitamin D supplementation 50 mcg daily.  Last vitamin D was 24 ng/mL on 08/26/2022.    Hypertension:  Follow up on cardiovascular conditions:  Cardiac:   Chest pain?No  Palpitations? No  Increased fournier?  No  Other:  Resp:   Shortness of breath?No She states that she will get winded on the last 2 stairs when going upstairs but this is not new.  Wheezing No  Cough No  Other:  Extremities:   Leg or ankle swelling?No   Claudication?No  Other:  Neuro:  DizzinessNo  SyncopeNo   Blurred visionNo  New headaches No  Other:  Medications:   Current Outpatient Medications   Medication Instructions    azelastine (Astelin) 137 mcg (0.1 %) nasal spray spray 2 sprays into each nostril 2 times a day as needed for rhinitis.    chlorthalidone (HYGROTON) 25 mg, oral, Daily    cholecalciferol (VITAMIN D-3) 50 mcg, oral, Daily    cyanocobalamin, vitamin B-12, 1,000 mcg tablet, sublingual 1 tablet, sublingual, Daily    Eliquis 2.5 mg tablet Take 1 tablet (2.5 mg) by mouth every 12 hours.    ferrous  "fumarate-vitamin C (Brittaney-Sequeles 65-25) 1 tablet, oral, Every other day, Do not crush, chew, or split.    furosemide (Lasix) 40 mg tablet Keep on med list in case needed for prn use for leg swelling in the future. Currently not needing oct 2023.    irbesartan (AVAPRO) 75 mg, oral, Daily    magnesium glycinate-mag oxide 120 mg magnesium capsule 1 tablet, oral, Daily    pantoprazole (PROTONIX) 40 mg, oral, Daily before breakfast    sertraline (Zoloft) 50 mg tablet Take 1&1/2 tablets (75 mg) by mouth once daily.      Taking them regularly.Yes  Side effects.No    Anticoagulant Use: Denies seeing blood in urine or stool. No unusual bruising. No nose bleeds. No head trauma or unusual headaches since last visit.  Patient continues on Eliquis 2.5 mg X2 daily.  Lab Results   Component Value Date    WBC 4.3 (L) 09/10/2024    HGB 11.1 (L) 09/10/2024    HCT 34.4 (L) 09/10/2024    MCV 85 09/10/2024     09/10/2024       Hyperlipidemia:  No myalgias, nausea, abdominal pain.  Meds: Taking regularly, no adverse effect.  Labs: LDL was 99 mg/dL on 08/26/2022.  LDL goal: <70 mg/dL.     Asthma:   No complaints.  Use of rescue inhaler: Intermittent.  Medications : Taking them regularly , no side effects.  No SOB, cough, sputum, wheezing. No SOB on exertion.     Anxiety:  Stable. Patient did not take the fluoxetine 10 mg prescribed at last visit due to read side effects.  She continues on Zoloft 75 mg daily.     Balance Issues:  Patient states that she is \"clumsy\" and will fall backwards at times.  I recommended balance exercises and showed the patient some exercises that she could perform at home which are beneficial.she declines going back to pt. This is not a new issues, she uses her cane. She holds on to railings with stairs. Issues x years. She has lumbar spinal stenosis, and peripheral neuropathy x years which contribute to this issue.    Left Index Finger Injury:  Patient states that she injured her left index finger " "opening the latch on her [employer] iron back in December 2023 and burned her finger a.  She reports, 1 month ago, her fingernail started to grow into her skin and she had to \"cut it out\" and now her finger looks weird and swollen. No fever chills or drainage. See PEOrder placed for referral to Orthopedic Surgery.  Order placed for XR.    Social History:  Patient does not drive any longer.lives independently. Has family who check on her daily. Goes up and down stairs in her own home, independent of adls' other than transportation.    Vaccines:  Recommended influenza in October.  She also requires a Prevnar 15 or Prevnar 20 (whichever is available is okay), RSV vaccine and Covid booster.    Orders placed for blood work as required.    Follow up 12/02/2024 for Wellness Visit.    Review of Systems  See ROS in HPI    Current Outpatient Medications   Medication Instructions    azelastine (Astelin) 137 mcg (0.1 %) nasal spray spray 2 sprays into each nostril 2 times a day as needed for rhinitis.    chlorthalidone (HYGROTON) 25 mg, oral, Daily    cholecalciferol (VITAMIN D-3) 50 mcg, oral, Daily    cyanocobalamin, vitamin B-12, 1,000 mcg tablet, sublingual 1 tablet, sublingual, Daily    Eliquis 2.5 mg tablet Take 1 tablet (2.5 mg) by mouth every 12 hours.    ferrous fumarate-vitamin C (Brittaney-Sequeles 65-25) 1 tablet, oral, Every other day, Do not crush, chew, or split.    furosemide (Lasix) 40 mg tablet Keep on med list in case needed for prn use for leg swelling in the future. Currently not needing oct 2023.    irbesartan (AVAPRO) 75 mg, oral, Daily    magnesium glycinate-mag oxide 120 mg magnesium capsule 1 tablet, oral, Daily    pantoprazole (PROTONIX) 40 mg, oral, Daily before breakfast    sertraline (Zoloft) 50 mg tablet Take 1&1/2 tablets (75 mg) by mouth once daily.     Allergies   Allergen Reactions    Amoxicillin Shortness of breath and Swelling     Reaction Date:Unknown    Codeine Shortness of breath    Penicillins " "Shortness of breath     Reaction Date:Unknown    Montelukast Cough    Morphine Nausea Only     Reaction Date:Approx [id number]    Opioids - Morphine Analogues Swelling    Spironolactone Nausea Only    Tramadol Nausea Only     Objective    Results  reviewed:   Latest Complete Lab Results:    Chemistry    Lab Results   Component Value Date/Time     06/11/2024 1134    K 4.2 06/11/2024 1134     06/11/2024 1134    CO2 30 06/11/2024 1134    BUN 25 (H) 06/11/2024 1134    CREATININE 0.89 06/11/2024 1134    Lab Results   Component Value Date/Time    CALCIUM 9.3 06/11/2024 1134    ALKPHOS 76 06/11/2024 1134    AST 15 06/11/2024 1134    ALT 8 06/11/2024 1134    BILITOT 0.7 06/11/2024 1134           Lab Results   Component Value Date    WBC 4.3 (L) 09/10/2024    HGB 11.1 (L) 09/10/2024    HCT 34.4 (L) 09/10/2024    MCV 85 09/10/2024     09/10/2024      Lab Results   Component Value Date    HGBA1C 5.8 (A) 04/11/2023      No results found for: \"LDLCALC\"   No results found for: \"ALBUR\", \"AUN71AHL\"   Lab Results   Component Value Date    NBBFNMKR29 539 02/27/2024      Lab Results   Component Value Date    TSH 2.18 08/26/2022    THYROIDPAB <10 07/11/2018      No results found for: \"PSA\"   Lab Results   Component Value Date    CHOL 152 08/26/2022    CHOL 196 02/01/2020    CHOL 200 (H) 12/04/2018     Lab Results   Component Value Date    HDL 30.1 (A) 08/26/2022    HDL 35.6 (A) 02/01/2020    HDL 43.9 12/04/2018     No results found for: \"LDLCALC\"  Lab Results   Component Value Date    TRIG 114 08/26/2022    TRIG 122 02/01/2020    TRIG 88 12/04/2018     Physical Exam  Vitals:      7/5/2023     4:57 PM 7/12/2023    11:14 AM 8/1/2023     8:36 AM 10/18/2023     2:25 PM 2/27/2024     8:25 AM 6/11/2024     9:37 AM 9/10/2024     8:27 AM   Vitals   Systolic 116 124 124 120 132 110 113   Diastolic 54 62 62 66 67 52 65   Heart Rate 64 60 64 64 70 60 64   Temp     36.4 °C (97.6 °F)     Resp 18 16 16 18  18 16   Height (in) 1.473 " "m (4' 10\") 1.473 m (4' 10\") 1.499 m (4' 11\") 1.499 m (4' 11\") 1.499 m (4' 11\") 1.499 m (4' 11\") 1.499 m (4' 11\")   Weight (lb) 158 153 157 158 156 152 152.8   BMI 33.02 kg/m2 31.98 kg/m2 31.71 kg/m2 31.91 kg/m2 31.51 kg/m2 30.7 kg/m2 30.86 kg/m2   BSA (m2) 1.71 m2 1.69 m2 1.72 m2 1.73 m2 1.72 m2 1.69 m2 1.7 m2   Visit Report Report Report Report Report Report Report Report     Patient looks well and is in no obvious distress.  Patient was able to get up on the examination table with assistance.  HEENT:   Normocephalic, no facial asymmetry  Eyes: Sclera and conjunctiva are clear.  Ptosis of right upper eyelid down to the pupil area.  Patient does not wish intervention.she has discussed with her eye doc.  Neck: No adenopathy cervical (Ant/post/lat), no Supraclavicular nodes.   Thyroid normal.  Carotid pulses normal, no carotid bruits.  Lungs : RR normal. Clear to auscultation anterior, posterior and lateral. No rales, wheezes rhonchi or rubs. Good air exchange.  Heart: Fairly regular with occasional irregularity. No gallop, click or rub.  She has a 3/6 systolic murmur heard loudest over the LSB but can be heard URSB and ULSB which fades to the apex.  Likely A-Fib which is common for this patient as she has moderate aortic stenosis.   Abdomen: Bowel sounds normal, No bruits. No pulsatile mass. No hepatosplenomegaly, masses or tenderness. Soft, no guarding.  Vascular:  Posterior tibialis and dorsalis pedis pulses within normal limits bilaterally.   Extremities: No upper extremity edema. No lower extremity edema.   Musculoskeletal: No synovitis of joints seen. No new deformity. Left index finger, distal phalanx is deformed with injured nailbed, onset 1 month.  Looks club-like as if amputated but not shorter.  Nail is growing over the top of the club.  No warmth or redness.  No ischemia.  I can see where she cut the fingernail.   Neuro: CN 2-12 intact. Alert, appropriate.  Ambulates independently.with cane.  No gross " motor deficit.   No tremors.  Psych: normal mood and affect.  Skin: No rash, bruising petechiae or jaundice.    Teresita was seen today for follow-up.  Diagnoses and all orders for this visit:  Iron deficiency (Primary)  -     Iron and TIBC; Future  -     Ferritin; Future  -     CBC; Future  -     ferrous fumarate-vitamin C (Brittaney-Sequeles 65-25); Take 1 tablet by mouth every other day. Do not crush, chew, or split.  Finger deformity, acquired, left  -     XR fingers left 2+ views; Future  -     Referral to Orthopaedic Surgery; Future  Atrial fibrillation status post cardioversion (Multi)  Vitamin B1 deficiency  Benign essential hypertension  Stage 3a chronic kidney disease (Multi)  Vitamin D deficiency  -     Vitamin D 25-Hydroxy,Total (for eval of Vitamin D levels); Future  Immunization counseling  Aortic valve stenosis, moderate  Persistent atrial fibrillation (Multi)  Generalized anxiety disorder  Peripheral polyneuropathy  Impairment of balance    She needs follow up labs. I am concerned about the bulbous appearance of the finger. There is bit of nail on the tip. Will ck xray.      See patient instructions in wrap up plan, orders and comments for treatment plan.  Patient Instructions:  we are going to try a different iron, if the pharmacy can order it : Ferrous fumarate with vitamin C: Brittaney-sequeles 65-25.  Take one EVERY OTHER DAY.    If we cannot find an iron that does not cause nausea, then will consider having you see Dr Reyes about iron infusions, or Dr Fermin hematologist.    Blood test today.  Xray left index finger.  Referral to hand ortho to assess that finger digit. Dr Gurmeet Buitrago. [facility].    Same medications.  Follow up for your wellness visit in December as scheduled.    Vaccines:  Flu shot here : Put her on flu clinic list.     You should get an RSV vaccine at your pharmacy (respiratory syncytial virus)-this is a one time vaccine.    Recommend  a Prevnar 15 or a Prevnar 20 pneumonia vaccine.  -whichever one they have at the pharmacy. This is an update to the pneumovax vaccine you had in the past. It lessens the severity of pneumococcal pneumonia, a bacterial pneumonia. This is not annual, you just need one this year.    An updated covid booster is recommended.  ---    Addendum : xray showed osteomyelitis of the finger. She has an appt with dr flores 9-16-24- I messaged him about this and he is aware. I called the patient and notified her as well. See telephone/result note.  Scribe Attestation  By signing my name below, I, Marielos Richardson, Scribe   attest that this documentation has been prepared under the direction and in the presence of Maya Perez MD.

## 2024-09-10 ENCOUNTER — LAB (OUTPATIENT)
Dept: LAB | Facility: LAB | Age: 89
End: 2024-09-10
Payer: MEDICARE

## 2024-09-10 ENCOUNTER — APPOINTMENT (OUTPATIENT)
Dept: PRIMARY CARE | Facility: CLINIC | Age: 89
End: 2024-09-10
Payer: MEDICARE

## 2024-09-10 ENCOUNTER — HOSPITAL ENCOUNTER (OUTPATIENT)
Dept: RADIOLOGY | Facility: CLINIC | Age: 89
Discharge: HOME | End: 2024-09-10
Payer: MEDICARE

## 2024-09-10 VITALS
HEART RATE: 64 BPM | RESPIRATION RATE: 16 BRPM | WEIGHT: 152.8 LBS | BODY MASS INDEX: 30.8 KG/M2 | OXYGEN SATURATION: 97 % | SYSTOLIC BLOOD PRESSURE: 113 MMHG | HEIGHT: 59 IN | DIASTOLIC BLOOD PRESSURE: 65 MMHG

## 2024-09-10 DIAGNOSIS — I10 BENIGN ESSENTIAL HYPERTENSION: ICD-10-CM

## 2024-09-10 DIAGNOSIS — I35.0 AORTIC VALVE STENOSIS, MODERATE: ICD-10-CM

## 2024-09-10 DIAGNOSIS — M20.002 FINGER DEFORMITY, ACQUIRED, LEFT: ICD-10-CM

## 2024-09-10 DIAGNOSIS — E61.1 IRON DEFICIENCY: Primary | ICD-10-CM

## 2024-09-10 DIAGNOSIS — I48.91 ATRIAL FIBRILLATION STATUS POST CARDIOVERSION (MULTI): ICD-10-CM

## 2024-09-10 DIAGNOSIS — I48.19 PERSISTENT ATRIAL FIBRILLATION (MULTI): ICD-10-CM

## 2024-09-10 DIAGNOSIS — G62.9 PERIPHERAL POLYNEUROPATHY: ICD-10-CM

## 2024-09-10 DIAGNOSIS — R26.89 IMPAIRMENT OF BALANCE: ICD-10-CM

## 2024-09-10 DIAGNOSIS — F41.1 GENERALIZED ANXIETY DISORDER: ICD-10-CM

## 2024-09-10 DIAGNOSIS — N18.31 STAGE 3A CHRONIC KIDNEY DISEASE (MULTI): ICD-10-CM

## 2024-09-10 DIAGNOSIS — Z71.85 IMMUNIZATION COUNSELING: ICD-10-CM

## 2024-09-10 DIAGNOSIS — E61.1 IRON DEFICIENCY: ICD-10-CM

## 2024-09-10 DIAGNOSIS — E51.9 VITAMIN B1 DEFICIENCY: ICD-10-CM

## 2024-09-10 DIAGNOSIS — E55.9 VITAMIN D DEFICIENCY: ICD-10-CM

## 2024-09-10 LAB
ERYTHROCYTE [DISTWIDTH] IN BLOOD BY AUTOMATED COUNT: 14 % (ref 11.5–14.5)
FERRITIN SERPL-MCNC: 143 NG/ML (ref 8–150)
HCT VFR BLD AUTO: 34.4 % (ref 36–46)
HGB BLD-MCNC: 11.1 G/DL (ref 12–16)
IRON SATN MFR SERPL: 14 % (ref 25–45)
IRON SERPL-MCNC: 47 UG/DL (ref 35–150)
MCH RBC QN AUTO: 27.4 PG (ref 26–34)
MCHC RBC AUTO-ENTMCNC: 32.3 G/DL (ref 32–36)
MCV RBC AUTO: 85 FL (ref 80–100)
NRBC BLD-RTO: 0 /100 WBCS (ref 0–0)
PLATELET # BLD AUTO: 151 X10*3/UL (ref 150–450)
RBC # BLD AUTO: 4.05 X10*6/UL (ref 4–5.2)
TIBC SERPL-MCNC: 338 UG/DL (ref 240–445)
UIBC SERPL-MCNC: 291 UG/DL (ref 110–370)
WBC # BLD AUTO: 4.3 X10*3/UL (ref 4.4–11.3)

## 2024-09-10 PROCEDURE — 36415 COLL VENOUS BLD VENIPUNCTURE: CPT

## 2024-09-10 PROCEDURE — 73140 X-RAY EXAM OF FINGER(S): CPT | Mod: LEFT SIDE | Performed by: RADIOLOGY

## 2024-09-10 PROCEDURE — 3074F SYST BP LT 130 MM HG: CPT | Performed by: INTERNAL MEDICINE

## 2024-09-10 PROCEDURE — 3078F DIAST BP <80 MM HG: CPT | Performed by: INTERNAL MEDICINE

## 2024-09-10 PROCEDURE — 82728 ASSAY OF FERRITIN: CPT

## 2024-09-10 PROCEDURE — 1126F AMNT PAIN NOTED NONE PRSNT: CPT | Performed by: INTERNAL MEDICINE

## 2024-09-10 PROCEDURE — 85027 COMPLETE CBC AUTOMATED: CPT

## 2024-09-10 PROCEDURE — 1160F RVW MEDS BY RX/DR IN RCRD: CPT | Performed by: INTERNAL MEDICINE

## 2024-09-10 PROCEDURE — 99215 OFFICE O/P EST HI 40 MIN: CPT | Performed by: INTERNAL MEDICINE

## 2024-09-10 PROCEDURE — 1157F ADVNC CARE PLAN IN RCRD: CPT | Performed by: INTERNAL MEDICINE

## 2024-09-10 PROCEDURE — 73140 X-RAY EXAM OF FINGER(S): CPT | Mod: LT

## 2024-09-10 PROCEDURE — 83550 IRON BINDING TEST: CPT

## 2024-09-10 PROCEDURE — 1159F MED LIST DOCD IN RCRD: CPT | Performed by: INTERNAL MEDICINE

## 2024-09-10 PROCEDURE — 83540 ASSAY OF IRON: CPT

## 2024-09-10 PROCEDURE — G2211 COMPLEX E/M VISIT ADD ON: HCPCS | Performed by: INTERNAL MEDICINE

## 2024-09-10 ASSESSMENT — PATIENT HEALTH QUESTIONNAIRE - PHQ9
2. FEELING DOWN, DEPRESSED OR HOPELESS: NOT AT ALL
1. LITTLE INTEREST OR PLEASURE IN DOING THINGS: NOT AT ALL
SUM OF ALL RESPONSES TO PHQ9 QUESTIONS 1 AND 2: 0
1. LITTLE INTEREST OR PLEASURE IN DOING THINGS: NOT AT ALL
2. FEELING DOWN, DEPRESSED OR HOPELESS: NOT AT ALL
SUM OF ALL RESPONSES TO PHQ9 QUESTIONS 1 AND 2: 0

## 2024-09-10 ASSESSMENT — PAIN SCALES - GENERAL: PAINLEVEL: 0-NO PAIN

## 2024-09-10 NOTE — PATIENT INSTRUCTIONS
we are going to try a different iron, if the pharmacy can order it : Ferrous fumarate with vitamin C: Brittaney-sequeles 65-25.  Take one EVERY OTHER DAY.    If we cannot find an iron that does not cause nausea, then will consider having you see Dr Reyes about iron infusions, or Dr Fermin hematologist.    Blood test today.  Xray left index finger.  Referral to hand ortho to assess that finger digit. Dr Gurmeet Buitrago. [facility].    Same medications.  Follow up for your wellness visit in December as scheduled.    Vaccines:  Flu shot here : Put her on flu clinic list.     You should get an RSV vaccine at your pharmacy (respiratory syncytial virus)-this is a one time vaccine.    Recommend  a Prevnar 15 or a Prevnar 20 pneumonia vaccine. -whichever one they have at the pharmacy. This is an update to the pneumovax vaccine you had in the past. It lessens the severity of pneumococcal pneumonia, a bacterial pneumonia. This is not annual, you just need one this year.    An updated covid booster is recommended.

## 2024-09-13 NOTE — RESULT ENCOUNTER NOTE
I spoke with Teresita about the xray finding and she plans to keep the appt with Dr Buitrago Monday. Messaged Dr Buitrago about these findings so he is aware. She is not acutely ill, so suspect this would be chronic osteo.

## 2024-09-16 ENCOUNTER — OFFICE VISIT (OUTPATIENT)
Dept: ORTHOPEDIC SURGERY | Facility: CLINIC | Age: 89
End: 2024-09-16
Payer: MEDICARE

## 2024-09-16 VITALS — HEIGHT: 59 IN | WEIGHT: 150 LBS | BODY MASS INDEX: 30.24 KG/M2

## 2024-09-16 DIAGNOSIS — M20.002 FINGER DEFORMITY, ACQUIRED, LEFT: ICD-10-CM

## 2024-09-16 PROCEDURE — 99203 OFFICE O/P NEW LOW 30 MIN: CPT | Performed by: ORTHOPAEDIC SURGERY

## 2024-09-16 PROCEDURE — 1159F MED LIST DOCD IN RCRD: CPT | Performed by: ORTHOPAEDIC SURGERY

## 2024-09-16 PROCEDURE — 1160F RVW MEDS BY RX/DR IN RCRD: CPT | Performed by: ORTHOPAEDIC SURGERY

## 2024-09-16 PROCEDURE — 1157F ADVNC CARE PLAN IN RCRD: CPT | Performed by: ORTHOPAEDIC SURGERY

## 2024-09-16 PROCEDURE — 99213 OFFICE O/P EST LOW 20 MIN: CPT | Performed by: ORTHOPAEDIC SURGERY

## 2024-09-16 PROCEDURE — 1036F TOBACCO NON-USER: CPT | Performed by: ORTHOPAEDIC SURGERY

## 2024-09-16 RX ORDER — SULFAMETHOXAZOLE AND TRIMETHOPRIM 800; 160 MG/1; MG/1
1 TABLET ORAL 2 TIMES DAILY
Qty: 28 TABLET | Refills: 0 | Status: SHIPPED | OUTPATIENT
Start: 2024-09-16 | End: 2024-09-30

## 2024-09-16 NOTE — PROGRESS NOTES
Subjective    Patient ID: Teresita Molina is a 89 y.o. female.    Chief Complaint: OTHER (LT HAND INDEX FINGER /POSSIBLE BURN )     Last Surgery: No surgery found  Last Surgery Date: No surgery found    HPI  Is a 89-year-old right-hand-dominant female who comes in 1 year history after sustaining a burn to her left index finger.  She states that there was initial swelling and erythema which resolved.  However then she noticed shortening of the finger which led to alteration of her fingernail.  She was referred here after an x-ray showed what was likely a chronic osteomyelitis in her left index finger distal phalanx.  The patient is more concerned about the appearance than the pain.    Objective   Ortho Exam  Patient is in no acute distress.  Exam of her left hand and wrist reveals she does have shortening with loss of pulp in her left index finger.  This is led to early hook nail but it does not reach the pulp itself.  There is no tenderness.  There is no erythema.  She has appropriate function of her FDS and FDP.    Image Results:  XR fingers left 2+ views  Narrative: Interpreted By:  Martin Amezcua,   STUDY:  XR FINGERS LEFT 2+ VIEWS; ;  9/10/2024 10:22 am      INDICATION:  Signs/Symptoms:deformed index finger distal phalanx for a month, had  burned it a year ago..      ,M20.002 Unspecified deformity of left finger(s)      COMPARISON:  None.      ACCESSION NUMBER(S):  [id number]      ORDERING CLINICIAN:  OTILIA CEBALLOS      FINDINGS:  Left 2nd digit, three views      There is a focal erosive change with osseous destruction of the 2nd  distal phalangeal tuft. Associated skin soft tissue defect noted.  There is no fracture or dislocation      Impression: Findings highly concerning for osteomyelitis in the 2nd distal  phalangeal tuft.          MACRO:  Critical Finding:  See findings. Notification was initiated on  9/11/2024 at 10:01 pm by  Martin Amezcua.  (**-YCF-**)      Signed by: Martin Amezcua 9/11/2024 10:01  PM  Dictation workstation:   [id number]      Assessment/Plan   Encounter Diagnoses:  Finger deformity, acquired, left    Orders Placed This Encounter    sulfamethoxazole-trimethoprim (Bactrim DS) 800-160 mg tablet     The patient likely did develop a degree of an osteomyelitis following the burn injury a year ago.  She never had any treatment for it previously.  I discussed with the patient and attempt could be made to treat this with oral antibiotics or she may require a partial amputation.  At this time the patient is refusing to consider an amputation.  However she we will try the antibiotics.  A prescription of Bactrim was placed.  The patient will follow-up as her symptoms dictate.

## 2024-10-09 ENCOUNTER — APPOINTMENT (OUTPATIENT)
Dept: PRIMARY CARE | Facility: CLINIC | Age: 89
End: 2024-10-09
Payer: MEDICARE

## 2024-10-09 VITALS — SYSTOLIC BLOOD PRESSURE: 112 MMHG | RESPIRATION RATE: 18 BRPM | DIASTOLIC BLOOD PRESSURE: 58 MMHG | HEART RATE: 60 BPM

## 2024-10-09 DIAGNOSIS — Z23 NEED FOR INFLUENZA VACCINATION: ICD-10-CM

## 2024-10-09 PROCEDURE — G0008 ADMIN INFLUENZA VIRUS VAC: HCPCS | Performed by: INTERNAL MEDICINE

## 2024-10-09 PROCEDURE — 90662 IIV NO PRSV INCREASED AG IM: CPT | Performed by: INTERNAL MEDICINE

## 2024-10-09 ASSESSMENT — PAIN SCALES - GENERAL: PAINLEVEL: 0-NO PAIN

## 2024-10-09 NOTE — PROGRESS NOTES
Pt here for flu vaccine. Vaccine administered per documentation. Pt tolerated well and denies questions or concerns.

## 2024-11-25 NOTE — PROGRESS NOTES
Subjective   Reason for Visit: Teresita Molina is an 89 y.o. female here for a Medicare Wellness Visit, Annual Physical, and follow up of conditions. Patient denies any new concerns today.  Past Medical, Surgical, and Family History reviewed and updated in chart.  Reviewed all medications by prescribing practitioner or clinical pharmacist (such as prescriptions, OTCs, herbal therapies and supplements) and documented in the medical record.  LAST VISIT PLAN FROM: 09/10/2024.  She needs follow up labs. I am concerned about the bulbous appearance of the finger. There is bit of nail on the tip. Will ck xray.  Patient Instructions: 09/10/2024.  we are going to try a different iron, if the pharmacy can order it : Ferrous fumarate with vitamin C: Brittaney-sequeles 65-25.  Take one EVERY OTHER DAY.  If we cannot find an iron that does not cause nausea, then will consider having you see Dr Reyes about iron infusions, or Dr Fermin hematologist.  Blood test today.  Xray left index finger.  Referral to hand ortho to assess that finger digit. Dr Gurmeet Flores. [facility].  Same medications.  Follow up for your wellness visit in December as scheduled.  Vaccines:  Flu shot here : Put her on flu clinic list.   You should get an RSV vaccine at your pharmacy (respiratory syncytial virus)-this is a one time vaccine.  Recommend  a Prevnar 15 or a Prevnar 20 pneumonia vaccine. -whichever one they have at the pharmacy. This is an update to the pneumovax vaccine you had in the past. It lessens the severity of pneumococcal pneumonia, a bacterial pneumonia. This is not annual, you just need one this year.  An updated covid booster is recommended.  Addendum : xray showed osteomyelitis of the finger. She has an appt with dr flores 9-16-24- I messaged him about this and he is aware. I called the patient and notified her as well. See telephone/result note.  END INFO FROM PRIOR VISIT     HPI  Health maintenance items last completed:  Colonoscopy:  1/30/2014, diminutive rectal polyps, benign nodule, diverticulosis, small internal hemorrhoids -Declines future and not needed.  Mammogram: Declined, aged out.  Bone Density: 12/04/2018, normal.    Urine albumin if HTN or DM2: 8/26/2022 was 7.7.  Pap: N/A; aged out.  Pelvic: N/A. no c/o not indicated for screening.  Breast exam in office:  Today.  Vaccines due or not completed: Pneumococcal completed. No zoster on file.  T-dap due 06/29/2026.  Influenza completed 10/09/2024.  Covid booster due.  Patient had her Prevnar 20 on 11/08/2024 and RSV vaccine 11/15/2024.  Last Health Maintenance exam: 12/01/2022.    Patient Care Team:  Maya Perez MD as PCP - General  Maya Perez MD as PCP - [employer] PCP  Ishaan Sim MD PhD as Consulting Physician (Cardiology)  Gurmeet Buitrago MD as Consulting Physician (Orthopaedic Surgery)     I reviewed current and past Medical, Surgical and Family History, as well as allergies and updated the medical record.    I reviewed the questions and answers of the Medicare Wellness Visit form including screenings for depression, alcohol, and fall risk.  We discussed the importance of installing grab bars in the shower.  She does not have a fear of falling but will be careful when walking and will use a cane.     I reviewed medications from this prescribing practitioner, outside practitioners, clinical pharmacist and patient directed including OTCs, herbal therapies and supplements, as well as prescriptions. These are documented in the medical record.      I discussed code status with the patient, and they understand the difference between full code and DNR.  She is full code.  I discussed HCPOA and LW. Her first HCPOA is her son, Godwin Carrera.    I reviewed and updated the patient's Care Team in the chart.    History of Squamous Cell Carcinoma:  We discussed the importance of following up with Dermatology.  Patient has a history of squamous cell carcinoma in  the past.    Hypertension:  Patient had followed with Cardiology, Dr. Sim, and underwent echocardiogram but has not followed up since.  We discussed, after review of Dr. Sim's note, that he wanted her to follow up after the echocardiogram.  I reviewed her echocardiogram which showed the following:  CONCLUSIONS:   1. Left ventricular systolic function is hyperdynamic with a 65-70% estimated ejection fraction.   2. There is moderate concentric left ventricular hypertrophy.   3. Absent A-wave on MV spectral Doppler tracing, consistent with atrial fibrillation.   4. There is low normal right ventricular systolic function.   5. The left atrium is moderate to severely dilated.   6. The right atrium is moderately dilated.   7. There is a small pericardial effusion.   8. There is moderate mitral annular calcification.   9. Mild to moderately elevated right ventricular systolic pressure.  10. Aortic valve appears abnormal.  11. Moderate aortic valve stenosis.  12. Atrial septal aneurysm present.  13. Moderate tricuspid regurgitation visualized.  Recommended following up with Dr. Sim.  Will do an EKG at next visit if she does not have one completed with Dr. Sim at her follow up.     A-Fib:  Follow up on cardiovascular conditions:  Cardiac:   Chest pain?No  Palpitations? No  Increased fournier?  No  Other:  Resp:   Shortness of breath?No  Wheezing No  Cough No  Other:  Extremities:   Leg or ankle swelling?No   Claudication?No  Other:  Neuro:  DizzinessNo  SyncopeNo   Blurred visionNo  New headaches No  Other:  Medications:Taking them regularly.Yes  Side effects.No    Anticoagulant Use: Denies seeing blood in urine or stool. No unusual bruising. No nose bleeds. No head trauma or unusual headaches since last visit.  Patient continues on Eliquis 2.5 mg every 12 hours.  Lab Results   Component Value Date    WBC 4.0 (L) 12/02/2024    HGB 11.2 (L) 12/02/2024    HCT 33.9 (L) 12/02/2024    MCV 88 12/02/2024    PLT  165 12/02/2024      Anxiety:  Stable.  Patient continues on Zoloft 75 mg daily.    Esophageal Reflux:  Stable.  Patient continues on Protonix 40 mg daily.    Numbness and Tingling of Feet:  Patient reports chronic numbness and tingling in bilateral feet.  She does have a history of neuropathy.    Headaches:  Patient reports daily headaches which is not new.  Denies vision changes.  Denies waking up with a headache.  She states it may correlate with stress.  In 2017, I ordered a CT Head to check for subdural hematoma which was negative.  Patient states that she thinks her headaches started when she started taking Eliquis.  I reviewed the side effects of Eliquis which did not report headaches.  Patient states that her headaches will resolve with Tylenol.  Order placed for CT Head.    Skin Rash:  Patient reports a skin rash on her forearms.    Diarrhea:  Intermittent.  Patient reports that it depends on what she eats, usually, 1-2 times per month.    Osteomyelitis of Left Index Finger:  Stable.  Denies it being bothersome.  She had seen a hand surgeon about osteomyelieitis on XR, Dr. Buitrago, one visit in September.  We discussed the importance of following up until resolved.    Iron Deficiency:  Patient states that she rodrick not take her iron supplementation any longer as it made her nauseous but will try again.    Left Leg Swelling:  Patient states that her left leg has mild swelling and states that if she does not do anything, her legs will not swell.    Cough:  Dysphagia:  Patient reports food getting stuck and she feels that her food does not go down properly.  Denies productive cough.  She states that it does not happen often.  Denies pain.  She states that it is aggravating at times.  She does take Protonix 40 mg daily.  We discussed that she should see GI.  Patient has followed with Dr. Reyes in the past.  Order placed for referral to GI.  Order placed for XR chest.    Asthma:   No complaints.    Use of rescue  inhaler: No  Medications : Taking them regularly , no side effects.  No SOB, cough, sputum, wheezing. No SOB on exertion.     Orders placed for blood work as required and UA.    At the end of the visit, I went out to the waiting room and discussed with her son, who brought her today, all of my orders and instructions that I discussed with her today. (5 minutes).    Follow up 03/31/2025.    Review of Systems  All systems reviewed and are negative unless otherwise stated in HPI or noted in writing on the written   3  page history and review of systems document reviewed by me and  scanned in with this visit. This is scanned either to notes or to media, with today's date.    Objective   Latest Complete Lab Results:  CBC:   Lab Results   Component Value Date    WBC 4.0 (L) 12/02/2024    RBC 3.86 (L) 12/02/2024    HGB 11.2 (L) 12/02/2024    HCT 33.9 (L) 12/02/2024    MCV 88 12/02/2024    MCH 29.0 12/02/2024    MCHC 33.0 12/02/2024     12/02/2024    MPV 12.0 (H) 10/18/2023       CBC w/Diff:   Lab Results   Component Value Date    WBC 4.0 (L) 12/02/2024    NRBC 0.0 12/02/2024    RBC 3.86 (L) 12/02/2024    HGB 11.2 (L) 12/02/2024    HCT 33.9 (L) 12/02/2024    MCV 88 12/02/2024    MCHC 33.0 12/02/2024     12/02/2024    RDW 13.2 12/02/2024    NEUTOPHILPCT 55.2 12/02/2024    IGPCT 0.3 12/02/2024    LYMPHOPCT 29.5 12/02/2024    MONOPCT 13.0 12/02/2024    EOSPCT 1.5 12/02/2024    BASOPCT 0.5 12/02/2024    NEUTROABS 2.21 12/02/2024    LYMPHSABS 1.18 12/02/2024    MONOSABS 0.52 12/02/2024    EOSABS 0.06 12/02/2024    BASOSABS 0.02 12/02/2024        CMP:  Lab Results   Component Value Date    GLUCOSE 92 06/11/2024     06/11/2024    K 4.2 06/11/2024     06/11/2024    CO2 30 06/11/2024    ANIONGAP 15 06/11/2024    BUN 25 (H) 06/11/2024    CREATININE 0.89 06/11/2024    GFRF 47 (A) 07/12/2023    CALCIUM 9.3 06/11/2024    ALBUMIN 4.2 06/11/2024    ALKPHOS 76 06/11/2024    PROT 6.2 (L) 06/11/2024    AST 15  "06/11/2024    BILITOT 0.7 06/11/2024    ALT 8 06/11/2024        BMP:  Lab Results   Component Value Date    GLUCOSE 92 06/11/2024    BUN 25 (H) 06/11/2024    CREATININE 0.89 06/11/2024     06/11/2024    K 4.2 06/11/2024     06/11/2024    CO2 30 06/11/2024    ANIONGAP 15 06/11/2024    CALCIUM 9.3 06/11/2024    EGFR 62 06/11/2024        Lipid:   Lab Results   Component Value Date    CHOL 152 08/26/2022    HDL 30.1 (A) 08/26/2022    CHHDL 5.0 08/26/2022    LDLF 99 08/26/2022    VLDL 23 08/26/2022    TRIG 114 08/26/2022       LDL Direct:  No results found for: \"LDLDIRECT\"     TSH:   Lab Results   Component Value Date    TSH 2.18 08/26/2022        B12:  Lab Results   Component Value Date    PIMIUHDU67 539 02/27/2024       Vitamin D:  Lab Results   Component Value Date    VITD25 24 (A) 08/26/2022        HgA1c:   Lab Results   Component Value Date    HGBA1C 5.8 (A) 04/11/2023    LMKJODAB9Z 120 04/11/2023     === 06/23/17 ===  CT HEAD WO CONTRAST  - Impression -  No acute intracranial process.  Chronic microvascular ischemic changes.     Vitals:      10/18/2023     2:25 PM 2/27/2024     8:25 AM 6/11/2024     9:37 AM 9/10/2024     8:27 AM 9/16/2024    10:36 AM 10/9/2024     9:08 AM 12/2/2024     9:32 AM   Vitals   Systolic 120 132 110 113  112 112   Diastolic 66 67 52 65  58 54   BP Location Left arm  Right arm       Heart Rate 64 70 60 64  60 64   Temp  36.4 °C (97.6 °F)        Resp 18  18 16  18 18   Height 1.499 m (4' 11\") 1.499 m (4' 11\") 1.499 m (4' 11\") 1.499 m (4' 11\") 1.499 m (4' 11\")  1.473 m (4' 10\")   Weight (lb) 158 156 152 152.8 150  155   BMI 31.91 kg/m2 31.51 kg/m2 30.7 kg/m2 30.86 kg/m2 30.3 kg/m2  32.4 kg/m2   BSA (m2) 1.73 m2 1.72 m2 1.69 m2 1.7 m2 1.68 m2  1.7 m2   Visit Report Report Report Report Report Report  Report     Physical Exam  General: Patient is known to me, looks well, is in usual state of health, with no obvious distress.  Patient is able to get up on the examination table " without assistance.  Head: Normocephalic  Eyes: PERRL, EOMI, no scleral icterus or conjunctival abnormality  Ears: Normal canals and TM's  Oropharynx: Well hydrated mucosa. no lesions, erythema. palate moves well.  Neck: No masses, no cervical (A/P/ or Lateral) adenopathy. Thyroid not enlarged and no nodules. Carotid pulses normal and no bruits.  Chest: Normal AP diameter. No supraclavicular adenopathy.  Lungs: Clear to auscultation: no rales , wheezes, rhonchi, rubs, examined bilaterally, ant/post /lateral. RR normal.  Heart: Rhythm is fairly regular but exam was consistent with chronic A-Fib.  Patient has a 2-3/6 systolic murmur heard over the URSB and ULSB which radiates faintly to the carotid arteries and you can hear it faintly to the apex.  Stable. No GCR S1 S2 normal. Has known aortic valve stenosis  Abdomen: BS normal, no bruits. No hepatosplenomegaly. No abdominal mass or tenderness. No distension.  Extremities: No upper extremity edema. Left leg mild edema noted.  Patient is wearing compressions socks. No ischemia.  Patient has 2 well-healed TKR scars, bilaterally.  Joints: No synovitis seen. Arthritic deformity in hand noted, left index finger.  Blunted distal tip with partial nail growing gack.She has seen a hand surgeon about osteomyelieitis on XR, Dr. Buitrago, one visit in September.  Finger is not red or warm and less swelling noted compared to previous exam.  We discussed the importance of following up with Dr. Buitrago.  Pulses: Normal posterior tibialis pulses, normal dorsalis pedis pulses. normal radial pulses. Normal femoral pulses without bruits.  Neuro: CN 2-12 intact . Alert, oriented, appropriate.  No focal weakness observed. No tremors. Ambulation is normal .  Psych: Mood and affect normal. No cognitive deficits noted during this exam. Alert, oriented, appropriate.  Skin: No petechiae or excessive bruising.  Purpura noted on bilateral forearms.  She has a rough, raised, flesh-colored mole on  her right ankle, approximately 1 cm, which had slight dried blood color in center from it rubbing on clothes per pt. denies scratching it.  Recommended following up with Dermatology.  Lymph: no SC axillary or inguinal adenopathy     Breast Exam : Symmetric appearance. No masses, nipple discharge, skin retraction, axillary or supraclavicular adenopathy.     Foot Exam:  Skin normal.  No lesions.  DP and PT pulses within normal limits.  Callus: None  Deformities: None.  Sensation to Light Touch: Decreased in bilateral feet. Very little sensation. Has known neuropathy.    Teresita was seen today for medicare annual wellness visit subsequent.  Diagnoses and all orders for this visit:  Encounter for subsequent annual wellness visit (AWV) in Medicare patient (Primary)  -     1 Year Follow Up In [facility] - PCP - Wellness Exam; Future  Atrial fibrillation status post cardioversion (Multi)  Benign essential hypertension  -     Albumin-Creatinine Ratio, Urine Random; Future  Stage 3a chronic kidney disease (Multi)  Aortic valve stenosis, moderate  Mild intermittent asthma without complication ([facility])  Finger osteomyelitis, left (Multi)  Frequent headaches  -     CT head wo IV contrast; Future  Headache, chronic daily  -     CT head wo IV contrast; Future  Generalized anxiety disorder  Anemia, unspecified type  -     CBC and Auto Differential; Future  Dependent edema  -     Basic metabolic panel; Future  Iron deficiency  Comments:  she is not taking iron as caused nausea.  Orders:  -     Iron and TIBC; Future  Esophageal dysphagia  -     Cancel: Referral to Gastroenterology; Future  -     Referral to Gastroenterology; Future  Recurrent cough  -     XR chest 2 views; Future  -     Cancel: Referral to Gastroenterology; Future  -     Referral to Gastroenterology; Future  Chronic cough  -     XR chest 2 views; Future  Gastroesophageal reflux disease, unspecified whether esophagitis present  -     Cancel: Referral to  "Gastroenterology; Future  -     Referral to Gastroenterology; Future  Full code status  Annual visit for general adult medical examination with abnormal findings  Osteomyelitis, unspecified  Generalized anxiety disorder  Gastro-esophageal reflux disease without esophagitis  Long term (current) use of anticoagulants  Other orders  -     Full code    Annual  history and physical completed including  ROS, PE.   Medicare wellness visit  completed including:  Immunizations,   Health Maintenance items,  Discussion of advanced directives and code status, which is: full code, ok cpr per pt, discussed toda  Fall risk and prevention addressed.  Functionality and pain were assessed.   Cancer screening testing was addressed as appropriate-see patient discussion/orders.   Medications were discussed and reviewed/reconciled and refill need assessed/handled.   Patient states taking their medication regularly and appropriately.  Also:   Depression screening PhQ-2 completed: mood stable on sertraline, diarrhea is only once or twice per month and she does not want to change the med.  Alcohol misuse screen: neg    In addition to the wellness visit and screening, the above medical issues were addressed:  As well as results of testing completed since last visit.  She needs to see gi to address the dysphagia and suspect the cough is from gerd as well. She is not wheezing.  Discussed with son as well as her.    Aortic valve stenosis, moderate on January 2024 echo, needs cardiology di neves , last seen 2 years ago, asymptomatic other than soboe at top of stairs not new. ECG next visit.   PAF stable  Anemia on oanticoag-she is not taking her iron, will kirsten labs. She does not want to take iron, she has our list of iron rich foods which she follows sometimes\"  Osteomyelitis on xray of finger right hand, it looks better, still deformed but not warm red or swollen.she took the atbs form dr flores but elected not to go back as ist was " better.  Patient Instructions:  It was nice to see you today. Blood pressure is good. Heart murmur sounds the same.    Blood test (and urine test if you can),to check on anemia and check iron level (since you are not taking any) and kidney/potassium levels to be done today.  Schedule CT head due to chronic headaches and taking a blood thinner. It iw NOT with contrast.    Chest xray today or when you get the CT scan of the head, due to chronic cough.    Referrals:  Please follow up with dermatology.  GI-Make an appointment with Dr Sushil Reyes about the swallowing issue and suspect your cough may be from reflux. You may need an upper scope and if there is a stricture there, they can dilate it while doing the scope.  The initial appt will likely be with his Nurse practitioner which is ok.    Cardiology, Make an appointment with Dr Sim for 2025, January to March to follow up on the aortic valve stenosis and atrial fibrillation.    Follow up Dr Perez in 3-4 months, sooner if needed.  Will plan on an ECG in the office at that visit unless you have one in Cardiology before I see you.      Scribe Attestation  By signing my name below, IMarielos, Scribe   attest that this documentation has been prepared under the direction and in the presence of Maya Perez MD.   59 minutes

## 2024-12-02 ENCOUNTER — LAB (OUTPATIENT)
Dept: LAB | Facility: LAB | Age: 89
End: 2024-12-02
Payer: MEDICARE

## 2024-12-02 ENCOUNTER — APPOINTMENT (OUTPATIENT)
Dept: PRIMARY CARE | Facility: CLINIC | Age: 89
End: 2024-12-02
Payer: MEDICARE

## 2024-12-02 ENCOUNTER — HOSPITAL ENCOUNTER (OUTPATIENT)
Dept: RADIOLOGY | Facility: CLINIC | Age: 89
Discharge: HOME | End: 2024-12-02
Payer: MEDICARE

## 2024-12-02 VITALS
DIASTOLIC BLOOD PRESSURE: 54 MMHG | WEIGHT: 155 LBS | HEIGHT: 58 IN | RESPIRATION RATE: 18 BRPM | BODY MASS INDEX: 32.54 KG/M2 | HEART RATE: 64 BPM | SYSTOLIC BLOOD PRESSURE: 112 MMHG

## 2024-12-02 DIAGNOSIS — R60.9 DEPENDENT EDEMA: ICD-10-CM

## 2024-12-02 DIAGNOSIS — M86.9 OSTEOMYELITIS, UNSPECIFIED: ICD-10-CM

## 2024-12-02 DIAGNOSIS — F41.1 GENERALIZED ANXIETY DISORDER: ICD-10-CM

## 2024-12-02 DIAGNOSIS — Z00.01 ANNUAL VISIT FOR GENERAL ADULT MEDICAL EXAMINATION WITH ABNORMAL FINDINGS: ICD-10-CM

## 2024-12-02 DIAGNOSIS — R05.3 CHRONIC COUGH: ICD-10-CM

## 2024-12-02 DIAGNOSIS — R51.9 HEADACHE, CHRONIC DAILY: ICD-10-CM

## 2024-12-02 DIAGNOSIS — K21.9 GASTRO-ESOPHAGEAL REFLUX DISEASE WITHOUT ESOPHAGITIS: ICD-10-CM

## 2024-12-02 DIAGNOSIS — D64.9 ANEMIA, UNSPECIFIED TYPE: ICD-10-CM

## 2024-12-02 DIAGNOSIS — E61.1 IRON DEFICIENCY: ICD-10-CM

## 2024-12-02 DIAGNOSIS — J45.20 MILD INTERMITTENT ASTHMA WITHOUT COMPLICATION (HHS-HCC): ICD-10-CM

## 2024-12-02 DIAGNOSIS — Z78.9 FULL CODE STATUS: ICD-10-CM

## 2024-12-02 DIAGNOSIS — Z79.01 LONG TERM (CURRENT) USE OF ANTICOAGULANTS: ICD-10-CM

## 2024-12-02 DIAGNOSIS — N18.31 STAGE 3A CHRONIC KIDNEY DISEASE (MULTI): ICD-10-CM

## 2024-12-02 DIAGNOSIS — I10 BENIGN ESSENTIAL HYPERTENSION: ICD-10-CM

## 2024-12-02 DIAGNOSIS — I35.0 AORTIC VALVE STENOSIS, MODERATE: ICD-10-CM

## 2024-12-02 DIAGNOSIS — Z00.00 ENCOUNTER FOR SUBSEQUENT ANNUAL WELLNESS VISIT (AWV) IN MEDICARE PATIENT: Primary | ICD-10-CM

## 2024-12-02 DIAGNOSIS — R05.8 RECURRENT COUGH: ICD-10-CM

## 2024-12-02 DIAGNOSIS — R13.19 ESOPHAGEAL DYSPHAGIA: ICD-10-CM

## 2024-12-02 DIAGNOSIS — R51.9 FREQUENT HEADACHES: ICD-10-CM

## 2024-12-02 DIAGNOSIS — M86.9: ICD-10-CM

## 2024-12-02 DIAGNOSIS — K21.9 GASTROESOPHAGEAL REFLUX DISEASE, UNSPECIFIED WHETHER ESOPHAGITIS PRESENT: ICD-10-CM

## 2024-12-02 DIAGNOSIS — I48.91 ATRIAL FIBRILLATION STATUS POST CARDIOVERSION (MULTI): ICD-10-CM

## 2024-12-02 LAB
BASOPHILS # BLD AUTO: 0.02 X10*3/UL (ref 0–0.1)
BASOPHILS NFR BLD AUTO: 0.5 %
EOSINOPHIL # BLD AUTO: 0.06 X10*3/UL (ref 0–0.4)
EOSINOPHIL NFR BLD AUTO: 1.5 %
ERYTHROCYTE [DISTWIDTH] IN BLOOD BY AUTOMATED COUNT: 13.2 % (ref 11.5–14.5)
HCT VFR BLD AUTO: 33.9 % (ref 36–46)
HGB BLD-MCNC: 11.2 G/DL (ref 12–16)
IMM GRANULOCYTES # BLD AUTO: 0.01 X10*3/UL (ref 0–0.5)
IMM GRANULOCYTES NFR BLD AUTO: 0.3 % (ref 0–0.9)
LYMPHOCYTES # BLD AUTO: 1.18 X10*3/UL (ref 0.8–3)
LYMPHOCYTES NFR BLD AUTO: 29.5 %
MCH RBC QN AUTO: 29 PG (ref 26–34)
MCHC RBC AUTO-ENTMCNC: 33 G/DL (ref 32–36)
MCV RBC AUTO: 88 FL (ref 80–100)
MONOCYTES # BLD AUTO: 0.52 X10*3/UL (ref 0.05–0.8)
MONOCYTES NFR BLD AUTO: 13 %
NEUTROPHILS # BLD AUTO: 2.21 X10*3/UL (ref 1.6–5.5)
NEUTROPHILS NFR BLD AUTO: 55.2 %
NRBC BLD-RTO: 0 /100 WBCS (ref 0–0)
PLATELET # BLD AUTO: 165 X10*3/UL (ref 150–450)
RBC # BLD AUTO: 3.86 X10*6/UL (ref 4–5.2)
WBC # BLD AUTO: 4 X10*3/UL (ref 4.4–11.3)

## 2024-12-02 PROCEDURE — 1123F ACP DISCUSS/DSCN MKR DOCD: CPT | Performed by: INTERNAL MEDICINE

## 2024-12-02 PROCEDURE — 1170F FXNL STATUS ASSESSED: CPT | Performed by: INTERNAL MEDICINE

## 2024-12-02 PROCEDURE — 1160F RVW MEDS BY RX/DR IN RCRD: CPT | Performed by: INTERNAL MEDICINE

## 2024-12-02 PROCEDURE — 71046 X-RAY EXAM CHEST 2 VIEWS: CPT

## 2024-12-02 PROCEDURE — 83735 ASSAY OF MAGNESIUM: CPT

## 2024-12-02 PROCEDURE — 83540 ASSAY OF IRON: CPT

## 2024-12-02 PROCEDURE — 99397 PER PM REEVAL EST PAT 65+ YR: CPT | Performed by: INTERNAL MEDICINE

## 2024-12-02 PROCEDURE — 99213 OFFICE O/P EST LOW 20 MIN: CPT | Performed by: INTERNAL MEDICINE

## 2024-12-02 PROCEDURE — 1158F ADVNC CARE PLAN TLK DOCD: CPT | Performed by: INTERNAL MEDICINE

## 2024-12-02 PROCEDURE — 1036F TOBACCO NON-USER: CPT | Performed by: INTERNAL MEDICINE

## 2024-12-02 PROCEDURE — 71046 X-RAY EXAM CHEST 2 VIEWS: CPT | Performed by: RADIOLOGY

## 2024-12-02 PROCEDURE — 36415 COLL VENOUS BLD VENIPUNCTURE: CPT

## 2024-12-02 PROCEDURE — 3074F SYST BP LT 130 MM HG: CPT | Performed by: INTERNAL MEDICINE

## 2024-12-02 PROCEDURE — 1126F AMNT PAIN NOTED NONE PRSNT: CPT | Performed by: INTERNAL MEDICINE

## 2024-12-02 PROCEDURE — 80048 BASIC METABOLIC PNL TOTAL CA: CPT

## 2024-12-02 PROCEDURE — 83550 IRON BINDING TEST: CPT

## 2024-12-02 PROCEDURE — G0439 PPPS, SUBSEQ VISIT: HCPCS | Performed by: INTERNAL MEDICINE

## 2024-12-02 PROCEDURE — 85025 COMPLETE CBC W/AUTO DIFF WBC: CPT

## 2024-12-02 PROCEDURE — 3078F DIAST BP <80 MM HG: CPT | Performed by: INTERNAL MEDICINE

## 2024-12-02 PROCEDURE — 1159F MED LIST DOCD IN RCRD: CPT | Performed by: INTERNAL MEDICINE

## 2024-12-02 PROCEDURE — 1157F ADVNC CARE PLAN IN RCRD: CPT | Performed by: INTERNAL MEDICINE

## 2024-12-02 SDOH — ECONOMIC STABILITY: FOOD INSECURITY: WITHIN THE PAST 12 MONTHS, THE FOOD YOU BOUGHT JUST DIDN'T LAST AND YOU DIDN'T HAVE MONEY TO GET MORE.: NEVER TRUE

## 2024-12-02 SDOH — ECONOMIC STABILITY: TRANSPORTATION INSECURITY
IN THE PAST 12 MONTHS, HAS THE LACK OF TRANSPORTATION KEPT YOU FROM MEDICAL APPOINTMENTS OR FROM GETTING MEDICATIONS?: NO

## 2024-12-02 SDOH — ECONOMIC STABILITY: TRANSPORTATION INSECURITY
IN THE PAST 12 MONTHS, HAS LACK OF TRANSPORTATION KEPT YOU FROM MEETINGS, WORK, OR FROM GETTING THINGS NEEDED FOR DAILY LIVING?: NO

## 2024-12-02 SDOH — ECONOMIC STABILITY: FOOD INSECURITY: WITHIN THE PAST 12 MONTHS, YOU WORRIED THAT YOUR FOOD WOULD RUN OUT BEFORE YOU GOT MONEY TO BUY MORE.: NEVER TRUE

## 2024-12-02 SDOH — HEALTH STABILITY: PHYSICAL HEALTH: ON AVERAGE, HOW MANY MINUTES DO YOU ENGAGE IN EXERCISE AT THIS LEVEL?: 0 MIN

## 2024-12-02 SDOH — HEALTH STABILITY: PHYSICAL HEALTH: ON AVERAGE, HOW MANY DAYS PER WEEK DO YOU ENGAGE IN MODERATE TO STRENUOUS EXERCISE (LIKE A BRISK WALK)?: 0 DAYS

## 2024-12-02 SDOH — ECONOMIC STABILITY: INCOME INSECURITY: IN THE LAST 12 MONTHS, WAS THERE A TIME WHEN YOU WERE NOT ABLE TO PAY THE MORTGAGE OR RENT ON TIME?: NO

## 2024-12-02 ASSESSMENT — ACTIVITIES OF DAILY LIVING (ADL)
TAKING MEDICATION: INDEPENDENT
FEEDING YOURSELF: INDEPENDENT
PILL BOX USED: YES
TOILETING: INDEPENDENT
JUDGMENT_ADEQUATE_SAFELY_COMPLETE_DAILY_ACTIVITIES: YES
GROCERY SHOPPING: TOTAL CARE
USING TELEPHONE: INDEPENDENT
GROOMING: INDEPENDENT
STIL DRIVING: NO
HEARING - RIGHT EAR: DIFFICULTY WITH NOISE
USING TRANSPORTATION: TOTAL CARE
PREPARING MEALS: INDEPENDENT
HEARING - LEFT EAR: FUNCTIONAL
DRESSING YOURSELF: INDEPENDENT
EATING: INDEPENDENT
MANAGING FINANCES: INDEPENDENT
PATIENT'S MEMORY ADEQUATE TO SAFELY COMPLETE DAILY ACTIVITIES?: YES
DOING HOUSEWORK: INDEPENDENT
NEEDS ASSISTANCE WITH FOOD: INDEPENDENT
ADEQUATE_TO_COMPLETE_ADL: YES
BATHING: INDEPENDENT
WALKS IN HOME: INDEPENDENT

## 2024-12-02 ASSESSMENT — ANXIETY QUESTIONNAIRES
7. FEELING AFRAID AS IF SOMETHING AWFUL MIGHT HAPPEN: NOT AT ALL
5. BEING SO RESTLESS THAT IT IS HARD TO SIT STILL: NOT AT ALL
IF YOU CHECKED OFF ANY PROBLEMS ON THIS QUESTIONNAIRE, HOW DIFFICULT HAVE THESE PROBLEMS MADE IT FOR YOU TO DO YOUR WORK, TAKE CARE OF THINGS AT HOME, OR GET ALONG WITH OTHER PEOPLE: NOT DIFFICULT AT ALL
6. BECOMING EASILY ANNOYED OR IRRITABLE: NOT AT ALL
1. FEELING NERVOUS, ANXIOUS, OR ON EDGE: NOT AT ALL
2. NOT BEING ABLE TO STOP OR CONTROL WORRYING: NOT AT ALL
4. TROUBLE RELAXING: NOT AT ALL
3. WORRYING TOO MUCH ABOUT DIFFERENT THINGS: NOT AT ALL
GAD7 TOTAL SCORE: 0

## 2024-12-02 ASSESSMENT — SOCIAL DETERMINANTS OF HEALTH (SDOH)
HOW OFTEN DO YOU ATTENT MEETINGS OF THE CLUB OR ORGANIZATION YOU BELONG TO?: NEVER
WITHIN THE LAST YEAR, HAVE TO BEEN RAPED OR FORCED TO HAVE ANY KIND OF SEXUAL ACTIVITY BY YOUR PARTNER OR EX-PARTNER?: NO
HOW OFTEN DO YOU ATTEND CHURCH OR RELIGIOUS SERVICES?: MORE THAN 4 TIMES PER YEAR
WITHIN THE LAST YEAR, HAVE YOU BEEN KICKED, HIT, SLAPPED, OR OTHERWISE PHYSICALLY HURT BY YOUR PARTNER OR EX-PARTNER?: NO
HOW OFTEN DO YOU GET TOGETHER WITH FRIENDS OR RELATIVES?: NEVER
IN THE PAST 12 MONTHS, HAS THE ELECTRIC, GAS, OIL, OR WATER COMPANY THREATENED TO SHUT OFF SERVICE IN YOUR HOME?: NO
DO YOU BELONG TO ANY CLUBS OR ORGANIZATIONS SUCH AS CHURCH GROUPS UNIONS, FRATERNAL OR ATHLETIC GROUPS, OR SCHOOL GROUPS?: NO
WITHIN THE LAST YEAR, HAVE YOU BEEN AFRAID OF YOUR PARTNER OR EX-PARTNER?: NO
HOW HARD IS IT FOR YOU TO PAY FOR THE VERY BASICS LIKE FOOD, HOUSING, MEDICAL CARE, AND HEATING?: NOT HARD AT ALL
IN A TYPICAL WEEK, HOW MANY TIMES DO YOU TALK ON THE PHONE WITH FAMILY, FRIENDS, OR NEIGHBORS?: MORE THAN THREE TIMES A WEEK
WITHIN THE LAST YEAR, HAVE YOU BEEN HUMILIATED OR EMOTIONALLY ABUSED IN OTHER WAYS BY YOUR PARTNER OR EX-PARTNER?: NO

## 2024-12-02 ASSESSMENT — LIFESTYLE VARIABLES
HOW OFTEN DO YOU HAVE SIX OR MORE DRINKS ON ONE OCCASION: NEVER
AUDIT-C TOTAL SCORE: 0
HOW MANY STANDARD DRINKS CONTAINING ALCOHOL DO YOU HAVE ON A TYPICAL DAY: PATIENT DOES NOT DRINK
SKIP TO QUESTIONS 9-10: 1
HOW OFTEN DO YOU HAVE A DRINK CONTAINING ALCOHOL: NEVER

## 2024-12-02 ASSESSMENT — COLUMBIA-SUICIDE SEVERITY RATING SCALE - C-SSRS
2. HAVE YOU ACTUALLY HAD ANY THOUGHTS OF KILLING YOURSELF?: NO
6. HAVE YOU EVER DONE ANYTHING, STARTED TO DO ANYTHING, OR PREPARED TO DO ANYTHING TO END YOUR LIFE?: NO
1. IN THE PAST MONTH, HAVE YOU WISHED YOU WERE DEAD OR WISHED YOU COULD GO TO SLEEP AND NOT WAKE UP?: NO

## 2024-12-02 ASSESSMENT — ENCOUNTER SYMPTOMS
DEPRESSION: 0
OCCASIONAL FEELINGS OF UNSTEADINESS: 1
LOSS OF SENSATION IN FEET: 1

## 2024-12-02 ASSESSMENT — PATIENT HEALTH QUESTIONNAIRE - PHQ9
2. FEELING DOWN, DEPRESSED OR HOPELESS: NOT AT ALL
1. LITTLE INTEREST OR PLEASURE IN DOING THINGS: NOT AT ALL
SUM OF ALL RESPONSES TO PHQ9 QUESTIONS 1 & 2: 0

## 2024-12-02 ASSESSMENT — PAIN SCALES - GENERAL: PAINLEVEL_OUTOF10: 0-NO PAIN

## 2024-12-02 NOTE — PATIENT INSTRUCTIONS
It was nice to see you today. Blood pressure is good. Heart murmur sounds the same.    Blood test (and urine test if you can),to check on anemia and check iron level (since you are not taking any) and kidney/potassium levels to be done today.  Schedule CT head due to chronic headaches and taking a blood thinner. It iw NOT with contrast.    Chest xray today or when you get the CT scan of the head, due to chronic cough.    Referrals:  Please follow up with dermatology.  GI-Make an appointment with Dr Sushil Reyes about the swallowing issue and suspect your cough may be from reflux. You may need an upper scope and if there is a stricture there, they can dilate it while doing the scope.  The initial appt will likely be with his Nurse practitioner which is ok.    Cardiology, Make an appointment with Dr Sim for 2025, January to March to follow up on the aortic valve stenosis and atrial fibrillation.    Follow up Dr Perez in 3-4 months, sooner if needed.  Will plan on an ECG in the office at that visit unless you have one in Cardiology before I see you.

## 2024-12-03 LAB
ANION GAP SERPL CALC-SCNC: 14 MMOL/L (ref 10–20)
BUN SERPL-MCNC: 23 MG/DL (ref 6–23)
CALCIUM SERPL-MCNC: 9.4 MG/DL (ref 8.6–10.6)
CHLORIDE SERPL-SCNC: 103 MMOL/L (ref 98–107)
CO2 SERPL-SCNC: 30 MMOL/L (ref 21–32)
CREAT SERPL-MCNC: 1.06 MG/DL (ref 0.5–1.05)
EGFRCR SERPLBLD CKD-EPI 2021: 50 ML/MIN/1.73M*2
GLUCOSE SERPL-MCNC: 94 MG/DL (ref 74–99)
IRON SATN MFR SERPL: 15 % (ref 25–45)
IRON SERPL-MCNC: 48 UG/DL (ref 35–150)
MAGNESIUM SERPL-MCNC: 1.82 MG/DL (ref 1.6–2.4)
POTASSIUM SERPL-SCNC: 4.3 MMOL/L (ref 3.5–5.3)
SODIUM SERPL-SCNC: 143 MMOL/L (ref 136–145)
TIBC SERPL-MCNC: 325 UG/DL (ref 240–445)
UIBC SERPL-MCNC: 277 UG/DL (ref 110–370)

## 2024-12-03 NOTE — RESULT ENCOUNTER NOTE
Please call the patient regarding her result. Stable anemia, she still needs to see gi about the swallowing.

## 2024-12-09 ENCOUNTER — HOSPITAL ENCOUNTER (OUTPATIENT)
Dept: RADIOLOGY | Facility: CLINIC | Age: 89
Discharge: HOME | End: 2024-12-09
Payer: MEDICARE

## 2024-12-09 DIAGNOSIS — R51.9 HEADACHE, CHRONIC DAILY: ICD-10-CM

## 2024-12-09 DIAGNOSIS — R51.9 FREQUENT HEADACHES: ICD-10-CM

## 2024-12-09 PROCEDURE — 70450 CT HEAD/BRAIN W/O DYE: CPT | Performed by: RADIOLOGY

## 2024-12-09 PROCEDURE — 70450 CT HEAD/BRAIN W/O DYE: CPT

## 2024-12-10 ENCOUNTER — TELEPHONE (OUTPATIENT)
Dept: PRIMARY CARE | Facility: CLINIC | Age: 89
End: 2024-12-10
Payer: MEDICARE

## 2024-12-10 NOTE — TELEPHONE ENCOUNTER
----- Message from Maya Perez sent at 12/9/2024  2:11 PM EST -----  Please call the patient regarding her result.  Head ct is ok, no bleeding or subdural. Just some hardening of the arteries/age related changes that are not unexpected.

## 2024-12-13 ENCOUNTER — OFFICE (OUTPATIENT)
Dept: URBAN - METROPOLITAN AREA CLINIC 26 | Facility: CLINIC | Age: 88
End: 2024-12-13
Payer: MEDICARE

## 2024-12-13 VITALS
HEIGHT: 59 IN | SYSTOLIC BLOOD PRESSURE: 140 MMHG | DIASTOLIC BLOOD PRESSURE: 83 MMHG | HEART RATE: 84 BPM | TEMPERATURE: 98.3 F | WEIGHT: 157 LBS

## 2024-12-13 DIAGNOSIS — D64.9 ANEMIA, UNSPECIFIED: ICD-10-CM

## 2024-12-13 DIAGNOSIS — R13.10 DYSPHAGIA, UNSPECIFIED: ICD-10-CM

## 2024-12-13 DIAGNOSIS — Z80.0 FAMILY HISTORY OF MALIGNANT NEOPLASM OF DIGESTIVE ORGANS: ICD-10-CM

## 2024-12-13 DIAGNOSIS — Z86.0101 PERSONAL HISTORY OF ADENOMATOUS AND SERRATED COLON POLYPS: ICD-10-CM

## 2024-12-13 DIAGNOSIS — K21.9 GASTRO-ESOPHAGEAL REFLUX DISEASE WITHOUT ESOPHAGITIS: ICD-10-CM

## 2024-12-13 PROCEDURE — 99204 OFFICE O/P NEW MOD 45 MIN: CPT | Performed by: INTERNAL MEDICINE

## 2024-12-17 ENCOUNTER — TELEPHONE (OUTPATIENT)
Dept: PRIMARY CARE | Facility: CLINIC | Age: 89
End: 2024-12-17
Payer: MEDICARE

## 2024-12-17 VITALS
SYSTOLIC BLOOD PRESSURE: 159 MMHG | WEIGHT: 155 LBS | OXYGEN SATURATION: 99 % | DIASTOLIC BLOOD PRESSURE: 44 MMHG | RESPIRATION RATE: 19 BRPM | RESPIRATION RATE: 2 BRPM | SYSTOLIC BLOOD PRESSURE: 93 MMHG | HEART RATE: 58 BPM | DIASTOLIC BLOOD PRESSURE: 56 MMHG | SYSTOLIC BLOOD PRESSURE: 99 MMHG | DIASTOLIC BLOOD PRESSURE: 56 MMHG | SYSTOLIC BLOOD PRESSURE: 82 MMHG | DIASTOLIC BLOOD PRESSURE: 68 MMHG | HEART RATE: 70 BPM | SYSTOLIC BLOOD PRESSURE: 102 MMHG | DIASTOLIC BLOOD PRESSURE: 44 MMHG | OXYGEN SATURATION: 98 % | SYSTOLIC BLOOD PRESSURE: 159 MMHG | RESPIRATION RATE: 10 BRPM | SYSTOLIC BLOOD PRESSURE: 125 MMHG | RESPIRATION RATE: 2 BRPM | HEIGHT: 59 IN | RESPIRATION RATE: 22 BRPM | SYSTOLIC BLOOD PRESSURE: 101 MMHG | TEMPERATURE: 97.3 F | HEART RATE: 52 BPM | DIASTOLIC BLOOD PRESSURE: 45 MMHG | DIASTOLIC BLOOD PRESSURE: 42 MMHG | SYSTOLIC BLOOD PRESSURE: 103 MMHG | RESPIRATION RATE: 20 BRPM | HEART RATE: 68 BPM | RESPIRATION RATE: 22 BRPM | HEART RATE: 60 BPM | WEIGHT: 155 LBS | RESPIRATION RATE: 20 BRPM | RESPIRATION RATE: 14 BRPM | OXYGEN SATURATION: 97 % | HEART RATE: 56 BPM | HEIGHT: 59 IN | DIASTOLIC BLOOD PRESSURE: 42 MMHG | OXYGEN SATURATION: 98 % | SYSTOLIC BLOOD PRESSURE: 125 MMHG | RESPIRATION RATE: 16 BRPM | HEART RATE: 52 BPM | SYSTOLIC BLOOD PRESSURE: 102 MMHG | RESPIRATION RATE: 19 BRPM | DIASTOLIC BLOOD PRESSURE: 45 MMHG | RESPIRATION RATE: 22 BRPM | DIASTOLIC BLOOD PRESSURE: 57 MMHG | DIASTOLIC BLOOD PRESSURE: 52 MMHG | SYSTOLIC BLOOD PRESSURE: 104 MMHG | TEMPERATURE: 97.3 F | RESPIRATION RATE: 16 BRPM | TEMPERATURE: 97.3 F | HEART RATE: 58 BPM | RESPIRATION RATE: 19 BRPM | HEART RATE: 78 BPM | SYSTOLIC BLOOD PRESSURE: 99 MMHG | SYSTOLIC BLOOD PRESSURE: 82 MMHG | HEART RATE: 60 BPM | RESPIRATION RATE: 10 BRPM | RESPIRATION RATE: 16 BRPM | SYSTOLIC BLOOD PRESSURE: 101 MMHG | DIASTOLIC BLOOD PRESSURE: 64 MMHG | OXYGEN SATURATION: 100 % | SYSTOLIC BLOOD PRESSURE: 159 MMHG | DIASTOLIC BLOOD PRESSURE: 44 MMHG | OXYGEN SATURATION: 99 % | HEIGHT: 59 IN | SYSTOLIC BLOOD PRESSURE: 104 MMHG | HEART RATE: 52 BPM | SYSTOLIC BLOOD PRESSURE: 154 MMHG | HEART RATE: 60 BPM | HEART RATE: 56 BPM | RESPIRATION RATE: 14 BRPM | RESPIRATION RATE: 20 BRPM | SYSTOLIC BLOOD PRESSURE: 93 MMHG | SYSTOLIC BLOOD PRESSURE: 104 MMHG | OXYGEN SATURATION: 99 % | HEART RATE: 58 BPM | RESPIRATION RATE: 10 BRPM | DIASTOLIC BLOOD PRESSURE: 57 MMHG | DIASTOLIC BLOOD PRESSURE: 52 MMHG | SYSTOLIC BLOOD PRESSURE: 82 MMHG | HEART RATE: 68 BPM | SYSTOLIC BLOOD PRESSURE: 103 MMHG | DIASTOLIC BLOOD PRESSURE: 42 MMHG | SYSTOLIC BLOOD PRESSURE: 154 MMHG | RESPIRATION RATE: 14 BRPM | OXYGEN SATURATION: 98 % | SYSTOLIC BLOOD PRESSURE: 103 MMHG | HEART RATE: 78 BPM | DIASTOLIC BLOOD PRESSURE: 64 MMHG | SYSTOLIC BLOOD PRESSURE: 101 MMHG | HEART RATE: 56 BPM | HEART RATE: 70 BPM | DIASTOLIC BLOOD PRESSURE: 45 MMHG | DIASTOLIC BLOOD PRESSURE: 68 MMHG | DIASTOLIC BLOOD PRESSURE: 68 MMHG | HEART RATE: 68 BPM | OXYGEN SATURATION: 97 % | RESPIRATION RATE: 2 BRPM | SYSTOLIC BLOOD PRESSURE: 99 MMHG | HEART RATE: 70 BPM | DIASTOLIC BLOOD PRESSURE: 52 MMHG | DIASTOLIC BLOOD PRESSURE: 57 MMHG | DIASTOLIC BLOOD PRESSURE: 56 MMHG | OXYGEN SATURATION: 100 % | SYSTOLIC BLOOD PRESSURE: 102 MMHG | DIASTOLIC BLOOD PRESSURE: 64 MMHG | SYSTOLIC BLOOD PRESSURE: 93 MMHG | OXYGEN SATURATION: 100 % | HEART RATE: 78 BPM | SYSTOLIC BLOOD PRESSURE: 125 MMHG | OXYGEN SATURATION: 97 % | SYSTOLIC BLOOD PRESSURE: 154 MMHG | WEIGHT: 155 LBS

## 2024-12-17 NOTE — TELEPHONE ENCOUNTER
Beth from [facility] called looking for the clearance to stop Eliquis for 3 days for patient's surgery.   Surgery is 12/26 so they would like asap

## 2024-12-25 PROBLEM — K22.2 PREVIOUS HISTORY OF SCHATZKI'S RING: Status: ACTIVE | Noted: 2024-12-26

## 2024-12-26 ENCOUNTER — AMBULATORY SURGICAL CENTER (OUTPATIENT)
Dept: URBAN - METROPOLITAN AREA SURGERY 12 | Facility: SURGERY | Age: 88
End: 2024-12-26
Payer: MEDICARE

## 2024-12-26 ENCOUNTER — OFFICE (OUTPATIENT)
Dept: URBAN - METROPOLITAN AREA PATHOLOGY 2 | Facility: PATHOLOGY | Age: 88
End: 2024-12-26
Payer: MEDICARE

## 2024-12-26 DIAGNOSIS — K31.89 OTHER DISEASES OF STOMACH AND DUODENUM: ICD-10-CM

## 2024-12-26 DIAGNOSIS — R13.10 DYSPHAGIA, UNSPECIFIED: ICD-10-CM

## 2024-12-26 DIAGNOSIS — K21.9 GASTRO-ESOPHAGEAL REFLUX DISEASE WITHOUT ESOPHAGITIS: ICD-10-CM

## 2024-12-26 DIAGNOSIS — K44.9 DIAPHRAGMATIC HERNIA WITHOUT OBSTRUCTION OR GANGRENE: ICD-10-CM

## 2024-12-26 DIAGNOSIS — K22.2 ESOPHAGEAL OBSTRUCTION: ICD-10-CM

## 2024-12-26 DIAGNOSIS — K31.7 POLYP OF STOMACH AND DUODENUM: ICD-10-CM

## 2024-12-26 PROCEDURE — 43239 EGD BIOPSY SINGLE/MULTIPLE: CPT | Mod: 59 | Performed by: INTERNAL MEDICINE

## 2024-12-26 PROCEDURE — 88342 IMHCHEM/IMCYTCHM 1ST ANTB: CPT | Performed by: PATHOLOGY

## 2024-12-26 PROCEDURE — 88305 TISSUE EXAM BY PATHOLOGIST: CPT | Performed by: PATHOLOGY

## 2024-12-26 PROCEDURE — 43251 EGD REMOVE LESION SNARE: CPT | Performed by: INTERNAL MEDICINE

## 2024-12-26 PROCEDURE — 43450 DILATE ESOPHAGUS 1/MULT PASS: CPT | Performed by: INTERNAL MEDICINE

## 2025-02-12 ENCOUNTER — TELEPHONE (OUTPATIENT)
Dept: PHARMACY | Facility: HOSPITAL | Age: OVER 89
End: 2025-02-12
Payer: MEDICARE

## 2025-02-12 NOTE — TELEPHONE ENCOUNTER
Population Health: Outreach by Ambulatory Pharmacy Team    Patient: Teresita Molina  Primary Care Provider (PCP): Maya Perez MD  Payor: [facility]  Reason: Adherence  Medication(s): Irbesartan 75 mg  Outcome: Left Voicemail    MARIELLA LOAIZA, Pharmacy Student    Margarita Augustin, [facility]  Pharmacy Resident

## 2025-02-12 NOTE — TELEPHONE ENCOUNTER
I reviewed the progress note and agree with the resident’s findings and plans as written. Case discussed with resident.    Latonia Ro, PharmD

## 2025-03-01 DIAGNOSIS — E51.9 VITAMIN B1 DEFICIENCY: ICD-10-CM

## 2025-03-01 DIAGNOSIS — I10 BENIGN ESSENTIAL HYPERTENSION: Primary | ICD-10-CM

## 2025-03-01 DIAGNOSIS — N18.31 STAGE 3A CHRONIC KIDNEY DISEASE (MULTI): ICD-10-CM

## 2025-03-01 DIAGNOSIS — E61.1 IRON DEFICIENCY: ICD-10-CM

## 2025-03-01 DIAGNOSIS — D72.819 LEUKOPENIA, UNSPECIFIED TYPE: ICD-10-CM

## 2025-03-01 DIAGNOSIS — D64.9 ANEMIA, UNSPECIFIED TYPE: ICD-10-CM

## 2025-03-01 DIAGNOSIS — E55.9 VITAMIN D DEFICIENCY: ICD-10-CM

## 2025-03-01 DIAGNOSIS — E53.8 LOW SERUM VITAMIN B12: ICD-10-CM

## 2025-03-01 DIAGNOSIS — Z51.81 MEDICATION MONITORING ENCOUNTER: ICD-10-CM

## 2025-03-01 NOTE — RESULT ENCOUNTER NOTE
Fairly stable labs, mild anemia fluctuates and stable since feb 2024, improved from 2023., Mildly low wbc fluctuates and was 4100 2023, and 4300 sept 2024. Will follow. Similar iron sat for 2 years. Magnesium normal. Creatinine occasionally fluctuates, gfr 50 will recheck prior to next appt.

## 2025-03-01 NOTE — RESULT ENCOUNTER NOTE
Please call the patient -she needs a follow up to her blood work from December-was stable then but need to follow her anemia and mild kidney disease, and she is also due for annual check of vitamin levels (history of B1, B12 and Vit d being low).  She does not have to fast but she should not take any vitamins the day of the blood test until after the blood is drawn.  She sees me end of march and just needs this done one week before her visit.

## 2025-03-05 ENCOUNTER — TELEPHONE (OUTPATIENT)
Dept: PRIMARY CARE | Facility: CLINIC | Age: OVER 89
End: 2025-03-05

## 2025-03-05 NOTE — TELEPHONE ENCOUNTER
----- Message from Maya Perez sent at 3/1/2025 12:10 PM EST -----  Please call the patient -she needs a follow up to her blood work from December-was stable then but need to follow her anemia and mild kidney disease, and she is also due for annual check of vitamin levels (history of B1, B12 and Vit d being low).  She does not have to fast but she should not take any vitamins the day of the blood test until after the blood is drawn.  She sees me end of march and just needs this done one week before her visit.

## 2025-03-05 NOTE — RESULT ENCOUNTER NOTE
Pt aware of results and recommendations. Pt will have her labs done 1 week before her 3/31 apt-reminded her of apt and time and the new [facility] system as well

## 2025-03-10 DIAGNOSIS — E55.9 VITAMIN D DEFICIENCY: ICD-10-CM

## 2025-03-10 DIAGNOSIS — D72.819 LEUKOPENIA, UNSPECIFIED TYPE: ICD-10-CM

## 2025-03-10 DIAGNOSIS — E51.9 VITAMIN B1 DEFICIENCY: ICD-10-CM

## 2025-03-10 DIAGNOSIS — I10 BENIGN ESSENTIAL HYPERTENSION: ICD-10-CM

## 2025-03-10 DIAGNOSIS — E61.1 IRON DEFICIENCY: ICD-10-CM

## 2025-03-10 DIAGNOSIS — D64.9 ANEMIA, UNSPECIFIED TYPE: ICD-10-CM

## 2025-03-10 DIAGNOSIS — N18.31 STAGE 3A CHRONIC KIDNEY DISEASE (MULTI): ICD-10-CM

## 2025-03-10 DIAGNOSIS — Z51.81 MEDICATION MONITORING ENCOUNTER: ICD-10-CM

## 2025-03-20 LAB — 25(OH)D3+25(OH)D2 SERPL-MCNC: 40 NG/ML (ref 30–100)

## 2025-03-21 LAB
25(OH)D3+25(OH)D2 SERPL-MCNC: 36 NG/ML (ref 30–100)
ALBUMIN SERPL-MCNC: 4.3 G/DL (ref 3.6–5.1)
ALBUMIN/GLOB SERPL: 2.2 (CALC) (ref 1–2.5)
ALP SERPL-CCNC: 69 U/L (ref 37–153)
ALT SERPL-CCNC: 8 U/L (ref 6–29)
ANION GAP SERPL CALCULATED.4IONS-SCNC: 10 MMOL/L (CALC) (ref 7–17)
AST SERPL-CCNC: 17 U/L (ref 10–35)
BASOPHILS # BLD AUTO: 11 CELLS/UL (ref 0–200)
BASOPHILS NFR BLD AUTO: 0.3 %
BILIRUB DIRECT SERPL-MCNC: 0.2 MG/DL
BILIRUB INDIRECT SERPL-MCNC: 0.6 MG/DL (CALC) (ref 0.2–1.2)
BILIRUB SERPL-MCNC: 0.8 MG/DL (ref 0.2–1.2)
BUN SERPL-MCNC: 29 MG/DL (ref 7–25)
BUN/CREAT SERPL: 28 (CALC) (ref 6–22)
CALCIUM SERPL-MCNC: 9.2 MG/DL (ref 8.6–10.4)
CHLORIDE SERPL-SCNC: 102 MMOL/L (ref 98–110)
CO2 SERPL-SCNC: 29 MMOL/L (ref 20–32)
CREAT SERPL-MCNC: 1.03 MG/DL (ref 0.6–0.95)
EGFRCR SERPLBLD CKD-EPI 2021: 52 ML/MIN/1.73M2
EOSINOPHIL # BLD AUTO: 61 CELLS/UL (ref 15–500)
EOSINOPHIL NFR BLD AUTO: 1.7 %
ERYTHROCYTE [DISTWIDTH] IN BLOOD BY AUTOMATED COUNT: 13.6 % (ref 11–15)
FERRITIN SERPL-MCNC: 79 NG/ML (ref 16–288)
GLOBULIN SER CALC-MCNC: 2 G/DL (CALC) (ref 1.9–3.7)
GLUCOSE SERPL-MCNC: 99 MG/DL (ref 65–99)
HCT VFR BLD AUTO: 35.6 % (ref 35–45)
HGB BLD-MCNC: 11.3 G/DL (ref 11.7–15.5)
IRON SATN MFR SERPL: 13 % (CALC) (ref 16–45)
IRON SERPL-MCNC: 41 MCG/DL (ref 45–160)
LYMPHOCYTES # BLD AUTO: 1109 CELLS/UL (ref 850–3900)
LYMPHOCYTES NFR BLD AUTO: 30.8 %
MCH RBC QN AUTO: 27.3 PG (ref 27–33)
MCHC RBC AUTO-ENTMCNC: 31.7 G/DL (ref 32–36)
MCV RBC AUTO: 86 FL (ref 80–100)
MONOCYTES # BLD AUTO: 493 CELLS/UL (ref 200–950)
MONOCYTES NFR BLD AUTO: 13.7 %
NEUTROPHILS # BLD AUTO: 1926 CELLS/UL (ref 1500–7800)
NEUTROPHILS NFR BLD AUTO: 53.5 %
PLATELET # BLD AUTO: 141 THOUSAND/UL (ref 140–400)
PMV BLD REES-ECKER: 11.3 FL (ref 7.5–12.5)
POTASSIUM SERPL-SCNC: 4.2 MMOL/L (ref 3.5–5.3)
PROT SERPL-MCNC: 6.3 G/DL (ref 6.1–8.1)
RBC # BLD AUTO: 4.14 MILLION/UL (ref 3.8–5.1)
SODIUM SERPL-SCNC: 141 MMOL/L (ref 135–146)
TIBC SERPL-MCNC: 308 MCG/DL (CALC) (ref 250–450)
VIT B1 BLD-SCNC: 157 NMOL/L (ref 78–185)
VIT B12 SERPL-MCNC: 601 PG/ML (ref 200–1100)
WBC # BLD AUTO: 3.6 THOUSAND/UL (ref 3.8–10.8)

## 2025-03-21 NOTE — RESULT ENCOUNTER NOTE
Stable chronic anemia, stable ckd3a and creatinine, wbc 3600, review of past multi year results shows range of wbc to be 3500-low 4000, so stable. Has march 31 appt to review.

## 2025-03-22 LAB
ALBUMIN/CREAT UR: 64 MG/G CREAT
CREAT UR-MCNC: 84 MG/DL (ref 20–275)
MICROALBUMIN UR-MCNC: 5.4 MG/DL

## 2025-03-25 NOTE — PROGRESS NOTES
"Patient ID: Teresita Molina is a 89 y.o. female who presents for Follow-up (Pt here for follow up and review. Pt denies any new problems or concerns at this time. Pt saw Dr Reyes \"last year,\" but no other updates at this time. Pt taking Fluoxetine now instead of sertraline now per your orders, but admits that sometimes, she only takes one a day, not twice a day. Pt also admits that she doesn't really like to take the iron pills.)    (LAST VISIT PLAN FROM: 12/02/2024:  She needs to see gi to address the dysphagia and suspect the cough is from gerd as well. She is not wheezing.  Discussed with son as well as her.  Aortic valve stenosis, moderate on January 2024 echo, needs cardiology di neves , last seen 2 years ago, asymptomatic other than soboe at top of stairs not new. ECG next visit.   PAF stable  Anemia on oanticoag-she is not taking her iron, will kirsten labs. She does not want to take iron, she has our list of iron rich foods which she follows sometimes\"  Osteomyelitis on xray of finger right hand, it looks better, still deformed but not warm red or swollen.she took the atbs form dr flores but elected not to go back as ist was better.  Patient Instructions:  It was nice to see you today. Blood pressure is good. Heart murmur sounds the same.  Blood test (and urine test if you can),to check on anemia and check iron level (since you are not taking any) and kidney/potassium levels to be done today.  Schedule CT head due to chronic headaches and taking a blood thinner. It iw NOT with contrast.  Chest xray today or when you get the CT scan of the head, due to chronic cough.  Referrals:  Please follow up with dermatology.  GI-Make an appointment with Dr Sushil Reyes about the swallowing issue and suspect your cough may be from reflux. You may need an upper scope and if there is a stricture there, they can dilate it while doing the scope.  The initial appt will likely be with his Nurse practitioner which is " ok.  Cardiology, Make an appointment with Dr Sim for 2025, January to March to follow up on the aortic valve stenosis and atrial fibrillation.  Follow up Dr Perez in 3-4 months, sooner if needed.  Will plan on an ECG in the office at that visit unless you have one in Cardiology before I see you.  59 minutes  END LAST VISIT PLAN)    HPI  Patient is a 89-year-old female who presents today for follow up.    Since last visit, patient underwent XR Chest and CT Head (see Objective).  She previously had egd with dilation of schatzki ring  Hypertension:  A-Fib:  BP today is 108/58.  Follow up on cardiovascular conditions:  Patient continues on chlorthalidone 25 mg daily and irbesartan 75 mg daily.  Cardiac:   Chest pain?No  Palpitations? No  Increased fournier?  No  Other:  Resp:   Shortness of breath?No  Wheezing No  Cough Yes  Other:  Extremities:   Leg or ankle swelling?No   Claudication?N/A  Other:  Neuro:  DizzinessNo  SyncopeN/A   Blurred visionNo  New headaches No  Other:  Medications:Taking them regularly.No  Side effects.No    Anticoagulant Use: Denies seeing blood in urine or stool. No unusual bruising. No nose bleeds. No head trauma or unusual headaches since last visit.  Patient continues on Eliquis 2.5 mg every 12 hours.  Lab Results   Component Value Date    WBC 3.6 (L) 03/13/2025    HGB 11.3 (L) 03/13/2025    HCT 35.6 03/13/2025    MCV 86.0 03/13/2025     03/13/2025      GERD:  Patient continues on Protonix 40 mg daily. Patient denies any heartburn or abdominal pain. However, complains of intermittent dysphagia and a persistent cough. Patient had EGD back in December of 2024, which showed a hiatal hernia. Will try getting better control of her GERD, as I think that might be a source of her chronic cough as well as affecting her vocal cords in the morning.     Anxiety:  Patient continues on Zoloft 75 mg daily. Patient is taking Fluoxetine now instead of sertraline, but admits that sometimes she only  takes one a day, not twice a day. Patient reports some improvement in mood but continues to have conflicts with her kids.     Anemia:  Last hemoglobin was 11.3 with iron at 41 on 03/13/2025, stable. CBC from August of 2023 showed hemoglobin 10.7. Patient does not take iron supplements due to side effects of constipation. Recommended to try taking iron pills at least once a week. Her white blood cell counts remain low. Will repeat blood work in 3 months.     Headaches:   Patient had a CT scan of the head. She continues to experience headaches daily and uses Tylenol for relief.     CKD 3a:  Last BUN was 29, creatinine 1.03 and EGFR at 52 on 03/13/2025.    Asthma:   Still has chronic cough. No other complaints  Medications : Taking them regularly , no side effects.  No SOB, cough, sputum, wheezing. No SOB on exertion.    Review of Systems  See ROS in HPI    Current Outpatient Medications   Medication Instructions    apixaban (ELIQUIS) 2.5 mg, oral, 2 times daily    azelastine (Astelin) 137 mcg (0.1 %) nasal spray spray 2 sprays into each nostril 2 times a day as needed for rhinitis.    chlorthalidone (HYGROTON) 25 mg, oral, Daily    cholecalciferol (VITAMIN D-3) 50 mcg, Daily    cyanocobalamin, vitamin B-12, 1,000 mcg tablet, sublingual 1 tablet, Daily    ferrous fumarate-vitamin C (Brittaney-Sequeles 65-25) Try taking it at least one pill per week.Do not crush, chew, or split.    FLUoxetine (PROZAC) 10 mg, oral, Daily    irbesartan (AVAPRO) 75 mg, oral, Daily    magnesium glycinate-mag oxide 120 mg magnesium capsule 1 tablet, Daily    pantoprazole (PROTONIX) 40 mg, oral, Daily before breakfast     Allergies   Allergen Reactions    Amoxicillin Shortness of breath and Swelling     Reaction Date:Unknown    Codeine Shortness of breath    Penicillins Shortness of breath     Reaction Date:Unknown    Montelukast Cough    Morphine Nausea Only     Reaction Date:Approx [id number]    Opioids - Morphine Analogues Swelling     Spironolactone Nausea Only    Tramadol Nausea Only     Objective    The following test results have been reviewed:  XR Chest 12/02/2024:  FINDINGS:  CARDIOMEDIASTINAL SILHOUETTE:  Cardiomediastinal silhouette is mildly enlarged.  LUNGS:  Lungs are clear. There is no consolidation or effusion  ABDOMEN:  No remarkable upper abdominal findings.  BONES:  No acute osseous changes.  IMPRESSION:  1. Enlargement cardiac silhouette without acute cardiopulmonary  process seen  MACRO:  None  Signed by: Martin Amezcua 12/3/2024 7:52 PM  Dictation workstation:   [id number]    CT Head 12/09/2024:  IMPRESSION:  No CT apparent acute infarct, intracranial hemorrhage, or mass  effect. Specifically, no evidence of subdural hemorrhage or fluid  collection.  I personally reviewed the images/study and I agree with Shira Graves DO's (radiology resident) findings as stated. This study  was interpreted at [place], [place].  MACRO:  None  Signed by: Taz Carter 12/9/2024 11:50 AM  Dictation workstation:   [id number]    Latest Complete Lab Results:  CBC:   Lab Results   Component Value Date    WBC 3.6 (L) 03/13/2025    RBC 4.14 03/13/2025    HGB 11.3 (L) 03/13/2025    HCT 35.6 03/13/2025    MCV 86.0 03/13/2025    MCH 27.3 03/13/2025    MCHC 31.7 (L) 03/13/2025     03/13/2025    MPV 11.3 03/13/2025     CMP:  Lab Results   Component Value Date    GLUCOSE 99 03/13/2025     03/13/2025    K 4.2 03/13/2025     03/13/2025    CO2 29 03/13/2025    ANIONGAP 10 03/13/2025    BUN 29 (H) 03/13/2025    CREATININE 1.03 (H) 03/13/2025    GFRF 47 (A) 07/12/2023    CALCIUM 9.2 03/13/2025    ALBUMIN 4.3 03/13/2025    ALKPHOS 69 03/13/2025    PROT 6.3 03/13/2025    AST 17 03/13/2025    BILITOT 0.8 03/13/2025    ALT 8 03/13/2025      Lipid:   Lab Results   Component Value Date    CHOL 152 08/26/2022    HDL 30.1 (A) 08/26/2022    CHHDL 5.0 08/26/2022    LDLF 99 08/26/2022    VLDL 23  "08/26/2022    TRIG 114 08/26/2022     LDL Direct:  No results found for: \"LDLDIRECT\"   TSH:   Lab Results   Component Value Date    TSH 2.18 08/26/2022      B12:  Lab Results   Component Value Date    MYTGPBTC27 601 03/13/2025     Vitamin D:  Lab Results   Component Value Date    VITD25 40 03/20/2025      HgA1c:   Lab Results   Component Value Date    HGBA1C 5.8 (A) 04/11/2023    APLZSQOL2W 120 04/11/2023       Physical Exam  Vitals:      2/27/2024     8:25 AM 6/11/2024     9:37 AM 9/10/2024     8:27 AM 9/16/2024    10:36 AM 10/9/2024     9:08 AM 12/2/2024     9:32 AM 3/31/2025    10:57 AM   Vitals   Systolic 132 110 113  112 112 108   Diastolic 67 52 65  58 54 58   BP Location  Right arm        Heart Rate 70 60 64  60 64 60   Temp 36.4 °C (97.6 °F)         Resp  18 16  18 18 16   Height 1.499 m (4' 11\") 1.499 m (4' 11\") 1.499 m (4' 11\") 1.499 m (4' 11\")  1.473 m (4' 10\") 1.473 m (4' 10\")   Weight (lb) 156 152 152.8 150  155 152   BMI 31.51 kg/m2 30.7 kg/m2 30.86 kg/m2 30.3 kg/m2  32.4 kg/m2 31.77 kg/m2   BSA (m2) 1.72 m2 1.69 m2 1.7 m2 1.68 m2  1.7 m2 1.68 m2     Patient looks their usual self, is known to me, and is in no obvious distress.  HEENT:   Normocephalic, no facial asymmetry  Eyes: Sclera and conjunctiva are clear.  Neck: No adenopathy cervical (Ant/post/lat), no Supraclavicular nodes.   Thyroid normal.  Carotid pulses normal, no carotid bruits.  Lungs : RR normal. Clear to auscultation anterior, posterior and lateral. No rales, wheezes rhonchi or rubs. Good air exchange.  Heart: Stable irregular heart rhythm. 2-3/6 systolic murmur that radiates to both carotids. This is not new. No gallop, click, or rub noted.   Extremities: Mild ankle edema, unchanged.   Musculoskeletal: No synovitis of joints seen. No new deformity.  Neuro: CN 2-12 intact. Alert, appropriate.  Ambulates independently, but walks with a cane.   No gross motor deficit.   No tremors.  Psych: normal mood and affect.  Skin: No rash, bruising " petechiae or jaundice.      Teresita was seen today for follow-up.  Diagnoses and all orders for this visit:  Persistent atrial fibrillation (Multi) (Primary)  Benign essential hypertension  Gastroesophageal reflux disease, unspecified whether esophagitis present  -     pantoprazole (ProtoNix) 40 mg EC tablet; Take 1 tablet (40 mg) by mouth once daily in the morning. Take before meals.  Generalized anxiety disorder  -     FLUoxetine (PROzac) 10 mg capsule; Take 1 capsule (10 mg) by mouth once daily.  Mild intermittent asthma without complication ([facility])  Anemia, unspecified type  -     CBC and Auto Differential; Future  Stage 3a chronic kidney disease (Multi)  Neutropenia, unspecified type  -     CBC and Auto Differential; Future  Iron deficiency  -     ferrous fumarate-vitamin C (Brittaney-Sequeles 65-25); Try taking it at least one pill per week.Do not crush, chew, or split.  Atrial fibrillation status post cardioversion (Multi)  Comments:  stable continue meds  Orders:  -     chlorthalidone (Hygroton) 25 mg tablet; Take 1 tablet (25 mg) by mouth once daily.  -     apixaban (Eliquis) 2.5 mg tablet; Take 1 tablet (2.5 mg) by mouth 2 times a day.  -     irbesartan (Avapro) 75 mg tablet; Take 1 tablet (75 mg) by mouth once daily.  Other orders  -     Discontinue: FLUoxetine (PROzac) 10 mg capsule; Take 1 capsule (10 mg) by mouth once daily.         See patient instructions in wrap up plan, orders and comments for treatment plan.  Patient Instructions:  Make a follow up appt with Dr Reyes, especially since swallowing is not better since after the ring in the esophagus was dilated.     In the meantime:   Change pantoprazole to twice daily: one in the morning and one before supper or at bedtime.   Do this for 90 days, then go back to once per day.  This is to help voice in the am (possibly caused by reflux during the night) and also see if it helps swallowing and your cough.     Let me and let Dr Reyes know what happens  with this increase in medication.  If it helps, but when you go back to one per day in July, your symptoms return we can change it to twice daily for a longer period of time.    CBC blood test in 3 months to check on lower white blood cell count. The anemia and kidneys are stable.    Try to take one iron tab per week.    Keep fluoxetine at 10 mg once per day since that seems to be working and you do not like taking capsules.You could take an extra 10mg if needed occasionally.  Glad the diarrhea is less now that you are not taking sertraline.     Follow up here September.    ---------------------     I am the Internal Medicine physician providing ongoing chronic medical care for this patient, which is managed during and in between office visits.    Scribe Attestation  By signing my name below, IMarielos, Jw   attest that this documentation has been prepared under the direction and in the presence of Maya Perez MD.     Scribe Attestation  By signing my name below, IJyoti Scribe attdale that this documentation has been prepared under the direction and in the presence of Maya Perez MD.

## 2025-03-31 ENCOUNTER — APPOINTMENT (OUTPATIENT)
Dept: PRIMARY CARE | Facility: CLINIC | Age: OVER 89
End: 2025-03-31
Payer: MEDICARE

## 2025-03-31 VITALS
HEIGHT: 58 IN | BODY MASS INDEX: 31.91 KG/M2 | HEART RATE: 60 BPM | RESPIRATION RATE: 16 BRPM | WEIGHT: 152 LBS | DIASTOLIC BLOOD PRESSURE: 58 MMHG | SYSTOLIC BLOOD PRESSURE: 108 MMHG

## 2025-03-31 DIAGNOSIS — D64.9 ANEMIA, UNSPECIFIED TYPE: ICD-10-CM

## 2025-03-31 DIAGNOSIS — R49.0 VOICE HOARSENESS: ICD-10-CM

## 2025-03-31 DIAGNOSIS — D70.9 NEUTROPENIA, UNSPECIFIED TYPE: ICD-10-CM

## 2025-03-31 DIAGNOSIS — I48.19 PERSISTENT ATRIAL FIBRILLATION (MULTI): Primary | ICD-10-CM

## 2025-03-31 DIAGNOSIS — I48.91 ATRIAL FIBRILLATION STATUS POST CARDIOVERSION (MULTI): Chronic | ICD-10-CM

## 2025-03-31 DIAGNOSIS — N18.31 STAGE 3A CHRONIC KIDNEY DISEASE (MULTI): ICD-10-CM

## 2025-03-31 DIAGNOSIS — K21.9 GASTROESOPHAGEAL REFLUX DISEASE, UNSPECIFIED WHETHER ESOPHAGITIS PRESENT: ICD-10-CM

## 2025-03-31 DIAGNOSIS — F41.1 GENERALIZED ANXIETY DISORDER: ICD-10-CM

## 2025-03-31 DIAGNOSIS — J45.20 MILD INTERMITTENT ASTHMA WITHOUT COMPLICATION (HHS-HCC): ICD-10-CM

## 2025-03-31 DIAGNOSIS — E61.1 IRON DEFICIENCY: ICD-10-CM

## 2025-03-31 DIAGNOSIS — R05.3 CHRONIC COUGH: ICD-10-CM

## 2025-03-31 DIAGNOSIS — I10 BENIGN ESSENTIAL HYPERTENSION: ICD-10-CM

## 2025-03-31 PROCEDURE — 3074F SYST BP LT 130 MM HG: CPT | Performed by: INTERNAL MEDICINE

## 2025-03-31 PROCEDURE — 1126F AMNT PAIN NOTED NONE PRSNT: CPT | Performed by: INTERNAL MEDICINE

## 2025-03-31 PROCEDURE — 3078F DIAST BP <80 MM HG: CPT | Performed by: INTERNAL MEDICINE

## 2025-03-31 PROCEDURE — 1160F RVW MEDS BY RX/DR IN RCRD: CPT | Performed by: INTERNAL MEDICINE

## 2025-03-31 PROCEDURE — 1159F MED LIST DOCD IN RCRD: CPT | Performed by: INTERNAL MEDICINE

## 2025-03-31 PROCEDURE — G2211 COMPLEX E/M VISIT ADD ON: HCPCS | Performed by: INTERNAL MEDICINE

## 2025-03-31 PROCEDURE — 1157F ADVNC CARE PLAN IN RCRD: CPT | Performed by: INTERNAL MEDICINE

## 2025-03-31 PROCEDURE — 1158F ADVNC CARE PLAN TLK DOCD: CPT | Performed by: INTERNAL MEDICINE

## 2025-03-31 PROCEDURE — 1123F ACP DISCUSS/DSCN MKR DOCD: CPT | Performed by: INTERNAL MEDICINE

## 2025-03-31 PROCEDURE — 99214 OFFICE O/P EST MOD 30 MIN: CPT | Performed by: INTERNAL MEDICINE

## 2025-03-31 RX ORDER — PANTOPRAZOLE SODIUM 40 MG/1
40 TABLET, DELAYED RELEASE ORAL 2 TIMES DAILY
Qty: 180 TABLET | Refills: 0 | Status: SHIPPED | OUTPATIENT
Start: 2025-03-31

## 2025-03-31 RX ORDER — FLUOXETINE 10 MG/1
10 CAPSULE ORAL 2 TIMES DAILY
COMMUNITY
End: 2025-03-31 | Stop reason: DRUGHIGH

## 2025-03-31 RX ORDER — FLUOXETINE 10 MG/1
10 CAPSULE ORAL DAILY
Status: SHIPPED
Start: 2025-03-31 | End: 2025-03-31 | Stop reason: SDUPTHER

## 2025-03-31 RX ORDER — IRBESARTAN 75 MG/1
75 TABLET ORAL DAILY
Qty: 90 TABLET | Refills: 2 | Status: SHIPPED | OUTPATIENT
Start: 2025-03-31

## 2025-03-31 RX ORDER — FLUOXETINE 10 MG/1
10 CAPSULE ORAL DAILY
Qty: 90 CAPSULE | Refills: 3 | Status: SHIPPED | OUTPATIENT
Start: 2025-03-31

## 2025-03-31 RX ORDER — PANTOPRAZOLE SODIUM 40 MG/1
40 TABLET, DELAYED RELEASE ORAL
Qty: 90 TABLET | Refills: 2 | Status: SHIPPED | OUTPATIENT
Start: 2025-03-31 | End: 2025-03-31 | Stop reason: SDUPTHER

## 2025-03-31 RX ORDER — CHLORTHALIDONE 25 MG/1
25 TABLET ORAL DAILY
Qty: 90 TABLET | Refills: 2 | Status: SHIPPED | OUTPATIENT
Start: 2025-03-31

## 2025-03-31 ASSESSMENT — PAIN SCALES - GENERAL: PAINLEVEL_OUTOF10: 0-NO PAIN

## 2025-03-31 NOTE — PATIENT INSTRUCTIONS
Make a follow up appt with Dr Reyes, especially since swallowing is not better since after the ring in the esophagus was dilated.     In the meantime:   Change pantoprazole to twice daily: one in the morning and one before supper or at bedtime.   Do this for 90 days, then go back to once per day.  This is to help voice in the am (possibly caused by reflux during the night) and also see if it helps swallowing and your cough.     Let me and let Dr Reyes know what happens with this increase in medication.  If it helps, but when you go back to one per day in July, your symptoms return we can change it to twice daily for a longer period of time.    CBC blood test in 3 months to check on lower white blood cell count. The anemia and kidneys are stable.    Try to take one iron tab per week.    Keep fluoxetine at 10 mg once per day since that seems to be working and you do not like taking capsules.You could take an extra 10mg if needed occasionally.  Glad the diarrhea is less now that you are not taking sertraline.     Follow up here September.

## 2025-06-30 DIAGNOSIS — D64.9 ANEMIA, UNSPECIFIED TYPE: ICD-10-CM

## 2025-06-30 DIAGNOSIS — D70.9 NEUTROPENIA, UNSPECIFIED TYPE: ICD-10-CM

## 2025-07-18 LAB
BASOPHILS # BLD AUTO: 0 CELLS/UL (ref 0–200)
BASOPHILS NFR BLD AUTO: 0 %
EOSINOPHIL # BLD AUTO: 39 CELLS/UL (ref 15–500)
EOSINOPHIL NFR BLD AUTO: 1.3 %
ERYTHROCYTE [DISTWIDTH] IN BLOOD BY AUTOMATED COUNT: 12.8 % (ref 11–15)
HCT VFR BLD AUTO: 35.5 % (ref 35–45)
HGB BLD-MCNC: 11.5 G/DL (ref 11.7–15.5)
LYMPHOCYTES # BLD AUTO: 1152 CELLS/UL (ref 850–3900)
LYMPHOCYTES NFR BLD AUTO: 38.4 %
MCH RBC QN AUTO: 28.7 PG (ref 27–33)
MCHC RBC AUTO-ENTMCNC: 32.4 G/DL (ref 32–36)
MCV RBC AUTO: 88.5 FL (ref 80–100)
MONOCYTES # BLD AUTO: 387 CELLS/UL (ref 200–950)
MONOCYTES NFR BLD AUTO: 12.9 %
NEUTROPHILS # BLD AUTO: 1422 CELLS/UL (ref 1500–7800)
NEUTROPHILS NFR BLD AUTO: 47.4 %
PLATELET # BLD AUTO: 137 THOUSAND/UL (ref 140–400)
PMV BLD REES-ECKER: 11.1 FL (ref 7.5–12.5)
RBC # BLD AUTO: 4.01 MILLION/UL (ref 3.8–5.1)
WBC # BLD AUTO: 3 THOUSAND/UL (ref 3.8–10.8)

## 2025-08-25 ENCOUNTER — TELEPHONE (OUTPATIENT)
Dept: PHARMACY | Facility: HOSPITAL | Age: OVER 89
End: 2025-08-25
Payer: MEDICARE

## 2025-12-10 ENCOUNTER — APPOINTMENT (OUTPATIENT)
Dept: PRIMARY CARE | Facility: CLINIC | Age: OVER 89
End: 2025-12-10
Payer: MEDICARE